# Patient Record
Sex: MALE | Race: WHITE | Employment: FULL TIME | URBAN - NONMETROPOLITAN AREA
[De-identification: names, ages, dates, MRNs, and addresses within clinical notes are randomized per-mention and may not be internally consistent; named-entity substitution may affect disease eponyms.]

---

## 2017-05-24 ENCOUNTER — HOSPITAL ENCOUNTER (EMERGENCY)
Facility: HOSPITAL | Age: 47
Discharge: HOME | End: 2017-05-24
Attending: EMERGENCY MEDICINE | Admitting: EMERGENCY MEDICINE
Payer: COMMERCIAL

## 2017-05-24 VITALS
DIASTOLIC BLOOD PRESSURE: 86 MMHG | TEMPERATURE: 98.6 F | WEIGHT: 300 LBS | OXYGEN SATURATION: 97 % | SYSTOLIC BLOOD PRESSURE: 159 MMHG | HEART RATE: 71 BPM | RESPIRATION RATE: 22 BRPM

## 2017-05-24 DIAGNOSIS — I10 ESSENTIAL HYPERTENSION: ICD-10-CM

## 2017-05-24 DIAGNOSIS — R07.89 ATYPICAL CHEST PAIN: ICD-10-CM

## 2017-05-24 DIAGNOSIS — E78.00 HYPERCHOLESTEREMIA: ICD-10-CM

## 2017-05-24 LAB
ALBUMIN SERPL-MCNC: 3.8 G/DL (ref 3.4–5)
ALBUMIN UR-MCNC: 30 MG/DL
ALP SERPL-CCNC: 65 U/L (ref 40–150)
ALT SERPL W P-5'-P-CCNC: 56 U/L (ref 0–70)
AMPHETAMINES UR QL SCN: NORMAL
ANION GAP SERPL CALCULATED.3IONS-SCNC: 9 MMOL/L (ref 3–14)
APPEARANCE UR: CLEAR
AST SERPL W P-5'-P-CCNC: 43 U/L (ref 0–45)
BACTERIA #/AREA URNS HPF: ABNORMAL /HPF
BARBITURATES UR QL: NORMAL
BASOPHILS # BLD AUTO: 0 10E9/L (ref 0–0.2)
BASOPHILS NFR BLD AUTO: 0.7 %
BENZODIAZ UR QL: NORMAL
BILIRUB SERPL-MCNC: 0.6 MG/DL (ref 0.2–1.3)
BILIRUB UR QL STRIP: NEGATIVE
BUN SERPL-MCNC: 11 MG/DL (ref 7–30)
CALCIUM SERPL-MCNC: 8.1 MG/DL (ref 8.5–10.1)
CANNABINOIDS UR QL SCN: NORMAL
CHLORIDE SERPL-SCNC: 101 MMOL/L (ref 94–109)
CHOLEST SERPL-MCNC: 259 MG/DL
CK SERPL-CCNC: 171 U/L (ref 30–300)
CO2 SERPL-SCNC: 28 MMOL/L (ref 20–32)
COCAINE UR QL: NORMAL
COLOR UR AUTO: YELLOW
CREAT SERPL-MCNC: 0.81 MG/DL (ref 0.66–1.25)
CRP SERPL-MCNC: <2.9 MG/L (ref 0–8)
D DIMER PPP DDU-MCNC: <200 NG/ML D-DU (ref 0–300)
DIFFERENTIAL METHOD BLD: NORMAL
EOSINOPHIL # BLD AUTO: 0.1 10E9/L (ref 0–0.7)
EOSINOPHIL NFR BLD AUTO: 2.6 %
ERYTHROCYTE [DISTWIDTH] IN BLOOD BY AUTOMATED COUNT: 12.8 % (ref 10–15)
ERYTHROCYTE [SEDIMENTATION RATE] IN BLOOD BY WESTERGREN METHOD: 6 MM/H (ref 0–15)
GFR SERPL CREATININE-BSD FRML MDRD: ABNORMAL ML/MIN/1.7M2
GLUCOSE SERPL-MCNC: 96 MG/DL (ref 70–99)
GLUCOSE UR STRIP-MCNC: NEGATIVE MG/DL
HCT VFR BLD AUTO: 44.1 % (ref 40–53)
HDLC SERPL-MCNC: 70 MG/DL
HGB BLD-MCNC: 15.3 G/DL (ref 13.3–17.7)
HGB UR QL STRIP: NEGATIVE
IMM GRANULOCYTES # BLD: 0 10E9/L (ref 0–0.4)
IMM GRANULOCYTES NFR BLD: 0.4 %
KETONES UR STRIP-MCNC: NEGATIVE MG/DL
LDLC SERPL CALC-MCNC: 174 MG/DL
LEUKOCYTE ESTERASE UR QL STRIP: NEGATIVE
LIPASE SERPL-CCNC: 98 U/L (ref 73–393)
LYMPHOCYTES # BLD AUTO: 1.2 10E9/L (ref 0.8–5.3)
LYMPHOCYTES NFR BLD AUTO: 25.8 %
MAGNESIUM SERPL-MCNC: 1.8 MG/DL (ref 1.6–2.3)
MCH RBC QN AUTO: 32.4 PG (ref 26.5–33)
MCHC RBC AUTO-ENTMCNC: 34.7 G/DL (ref 31.5–36.5)
MCV RBC AUTO: 93 FL (ref 78–100)
METHADONE UR QL SCN: NORMAL
MONOCYTES # BLD AUTO: 0.6 10E9/L (ref 0–1.3)
MONOCYTES NFR BLD AUTO: 13.8 %
MUCOUS THREADS #/AREA URNS LPF: PRESENT /LPF
NEUTROPHILS # BLD AUTO: 2.6 10E9/L (ref 1.6–8.3)
NEUTROPHILS NFR BLD AUTO: 56.7 %
NITRATE UR QL: NEGATIVE
NONHDLC SERPL-MCNC: 189 MG/DL
NRBC # BLD AUTO: 0 10*3/UL
NRBC BLD AUTO-RTO: 0 /100
OPIATES UR QL SCN: NORMAL
PCP UR QL SCN: NORMAL
PH UR STRIP: 7 PH (ref 4.7–8)
PLATELET # BLD AUTO: 159 10E9/L (ref 150–450)
POTASSIUM SERPL-SCNC: 3.7 MMOL/L (ref 3.4–5.3)
PROT SERPL-MCNC: 7.3 G/DL (ref 6.8–8.8)
RBC # BLD AUTO: 4.72 10E12/L (ref 4.4–5.9)
RBC #/AREA URNS AUTO: <1 /HPF (ref 0–2)
SODIUM SERPL-SCNC: 138 MMOL/L (ref 133–144)
SP GR UR STRIP: 1.02 (ref 1–1.03)
TRIGL SERPL-MCNC: 76 MG/DL
TROPONIN I SERPL-MCNC: NORMAL UG/L (ref 0–0.04)
TROPONIN I SERPL-MCNC: NORMAL UG/L (ref 0–0.04)
URN SPEC COLLECT METH UR: ABNORMAL
UROBILINOGEN UR STRIP-MCNC: NORMAL MG/DL (ref 0–2)
WBC # BLD AUTO: 4.6 10E9/L (ref 4–11)
WBC #/AREA URNS AUTO: <1 /HPF (ref 0–2)

## 2017-05-24 PROCEDURE — 93010 ELECTROCARDIOGRAM REPORT: CPT | Performed by: INTERNAL MEDICINE

## 2017-05-24 PROCEDURE — 86140 C-REACTIVE PROTEIN: CPT | Performed by: EMERGENCY MEDICINE

## 2017-05-24 PROCEDURE — 85379 FIBRIN DEGRADATION QUANT: CPT | Performed by: EMERGENCY MEDICINE

## 2017-05-24 PROCEDURE — 82550 ASSAY OF CK (CPK): CPT | Performed by: EMERGENCY MEDICINE

## 2017-05-24 PROCEDURE — 83690 ASSAY OF LIPASE: CPT | Performed by: EMERGENCY MEDICINE

## 2017-05-24 PROCEDURE — 96374 THER/PROPH/DIAG INJ IV PUSH: CPT

## 2017-05-24 PROCEDURE — 85652 RBC SED RATE AUTOMATED: CPT | Performed by: EMERGENCY MEDICINE

## 2017-05-24 PROCEDURE — 80053 COMPREHEN METABOLIC PANEL: CPT | Performed by: EMERGENCY MEDICINE

## 2017-05-24 PROCEDURE — 96361 HYDRATE IV INFUSION ADD-ON: CPT

## 2017-05-24 PROCEDURE — 99285 EMERGENCY DEPT VISIT HI MDM: CPT | Performed by: EMERGENCY MEDICINE

## 2017-05-24 PROCEDURE — 36415 COLL VENOUS BLD VENIPUNCTURE: CPT | Performed by: EMERGENCY MEDICINE

## 2017-05-24 PROCEDURE — 71020 ZZHC CHEST TWO VIEWS, FRONT/LAT: CPT | Mod: TC

## 2017-05-24 PROCEDURE — 80307 DRUG TEST PRSMV CHEM ANLYZR: CPT | Performed by: EMERGENCY MEDICINE

## 2017-05-24 PROCEDURE — 81001 URINALYSIS AUTO W/SCOPE: CPT | Performed by: EMERGENCY MEDICINE

## 2017-05-24 PROCEDURE — 84484 ASSAY OF TROPONIN QUANT: CPT | Mod: 91 | Performed by: EMERGENCY MEDICINE

## 2017-05-24 PROCEDURE — 83735 ASSAY OF MAGNESIUM: CPT | Performed by: EMERGENCY MEDICINE

## 2017-05-24 PROCEDURE — 25000128 H RX IP 250 OP 636: Performed by: EMERGENCY MEDICINE

## 2017-05-24 PROCEDURE — 80061 LIPID PANEL: CPT | Performed by: EMERGENCY MEDICINE

## 2017-05-24 PROCEDURE — 25000132 ZZH RX MED GY IP 250 OP 250 PS 637: Performed by: EMERGENCY MEDICINE

## 2017-05-24 PROCEDURE — 85025 COMPLETE CBC W/AUTO DIFF WBC: CPT | Performed by: EMERGENCY MEDICINE

## 2017-05-24 PROCEDURE — 99285 EMERGENCY DEPT VISIT HI MDM: CPT | Mod: 25

## 2017-05-24 PROCEDURE — 93005 ELECTROCARDIOGRAM TRACING: CPT

## 2017-05-24 RX ORDER — ATORVASTATIN CALCIUM 20 MG/1
20 TABLET, FILM COATED ORAL DAILY
Qty: 30 TABLET | Refills: 1 | Status: SHIPPED | OUTPATIENT
Start: 2017-05-24 | End: 2020-01-01

## 2017-05-24 RX ORDER — ASPIRIN 81 MG/1
324 TABLET, CHEWABLE ORAL ONCE
Status: DISCONTINUED | OUTPATIENT
Start: 2017-05-24 | End: 2017-05-24 | Stop reason: HOSPADM

## 2017-05-24 RX ORDER — LIDOCAINE 40 MG/G
CREAM TOPICAL
Status: DISCONTINUED | OUTPATIENT
Start: 2017-05-24 | End: 2017-05-24 | Stop reason: HOSPADM

## 2017-05-24 RX ORDER — LISINOPRIL 5 MG/1
10 TABLET ORAL ONCE
Status: COMPLETED | OUTPATIENT
Start: 2017-05-24 | End: 2017-05-24

## 2017-05-24 RX ORDER — ATORVASTATIN CALCIUM 10 MG/1
20 TABLET, FILM COATED ORAL DAILY
Status: DISCONTINUED | OUTPATIENT
Start: 2017-05-24 | End: 2017-05-24 | Stop reason: HOSPADM

## 2017-05-24 RX ORDER — SODIUM CHLORIDE 9 MG/ML
INJECTION, SOLUTION INTRAVENOUS CONTINUOUS
Status: DISCONTINUED | OUTPATIENT
Start: 2017-05-24 | End: 2017-05-24 | Stop reason: HOSPADM

## 2017-05-24 RX ORDER — LISINOPRIL 5 MG/1
10 TABLET ORAL DAILY
Qty: 60 TABLET | Refills: 1 | Status: SHIPPED | OUTPATIENT
Start: 2017-05-24 | End: 2020-01-01

## 2017-05-24 RX ORDER — LORAZEPAM 2 MG/ML
1 INJECTION INTRAMUSCULAR ONCE
Status: COMPLETED | OUTPATIENT
Start: 2017-05-24 | End: 2017-05-24

## 2017-05-24 RX ORDER — NITROGLYCERIN 0.4 MG/1
0.4 TABLET SUBLINGUAL EVERY 5 MIN PRN
Status: DISCONTINUED | OUTPATIENT
Start: 2017-05-24 | End: 2017-05-24 | Stop reason: HOSPADM

## 2017-05-24 RX ADMIN — LORAZEPAM 1 MG: 2 INJECTION, SOLUTION INTRAMUSCULAR; INTRAVENOUS at 09:26

## 2017-05-24 RX ADMIN — ATORVASTATIN CALCIUM 20 MG: 10 TABLET, FILM COATED ORAL at 11:15

## 2017-05-24 RX ADMIN — SODIUM CHLORIDE: 9 INJECTION, SOLUTION INTRAVENOUS at 09:25

## 2017-05-24 RX ADMIN — LISINOPRIL 10 MG: 5 TABLET ORAL at 11:15

## 2017-05-24 ASSESSMENT — ENCOUNTER SYMPTOMS
FATIGUE: 1
LIGHT-HEADEDNESS: 1
BACK PAIN: 1
SHORTNESS OF BREATH: 1
DYSPHORIC MOOD: 1
CHEST TIGHTNESS: 1
EYE REDNESS: 1
NUMBNESS: 1
NERVOUS/ANXIOUS: 1
NAUSEA: 1
DIZZINESS: 1
MYALGIAS: 1
PALPITATIONS: 1
WEAKNESS: 1

## 2017-05-24 NOTE — ED AVS SNAPSHOT
[facility]    [address] [address]    Phone:  [phone]                                       Shahrzad Walters   MRN: [id number]    Department:  [facility]   Date of Visit:  5/24/2017           Patient Information     Date Of Birth          1970        Your diagnoses for this visit were:     Atypical chest pain     Essential hypertension     Hypercholesteremia     Alcoholism /alcohol abuse (H)        You were seen by Chai Vaz MD.      Follow-up Information     Follow up with None.        Follow up with None.    Why:  As needed        Follow up In 2 weeks.        Discharge Instructions       Shahrzad,  Your chest pain today was assumed to be your heart but your ECG and Labs did not point to there being any cardiac injury.  Your risk factors including hypertension and elevated LDL cholesterol were noted and will hopefully respond to the meds we have started.  The nuclear imaging stress test has been ordered.  The Imaging dep't should call you with scheduling details.  Then followup with your provider people at [facility].  If that is a problem, let us know and the ER  can perhaps make some other appointment for you.  Hopefully, you will see the lite on your alcohol intake.  Stopping would solve several risk issues for you especially the depression and hypertension.  Nice meeting you.  Good luck with your work at [employer]--have a good summer.            Unfortunately, Dr. Cobb does not have any openings until August. A follow up appointment is scheduled with Demi Castellon NP at the [facility] in [place] on Wednesday 5/31 at 11:30. If you will need to reschedule this appointment, the clinic contact number is [phone].     ED Discharge Orders     NM Exercise stress test       The type of stress to be performed (pharmacologic or physical) will be at the discretion of the supervising physician as per department protocol.                     Review  of your medicines      START taking        Dose / Directions Last dose taken    atorvastatin 20 MG tablet   Commonly known as:  LIPITOR   Dose:  20 mg   Quantity:  30 tablet        Take 1 tablet (20 mg) by mouth daily   Refills:  1        lisinopril 5 MG tablet   Commonly known as:  PRINIVIL/ZESTRIL   Dose:  10 mg   Quantity:  60 tablet        Take 2 tablets (10 mg) by mouth daily   Refills:  1          Our records show that you are taking the medicines listed below. If these are incorrect, please call your family doctor or clinic.        Dose / Directions Last dose taken    CYMBALTA PO        Take by mouth daily   Refills:  0        EPINEPHrine 0.3 MG/0.3ML injection   Dose:  0.3 mg   Quantity:  0.6 mL        Inject 0.3 mLs (0.3 mg) into the muscle once as needed for anaphylaxis   Refills:  1                Prescriptions were sent or printed at these locations (2 Prescriptions)                   Other Prescriptions                Printed at Department/Unit printer (2 of 2)         lisinopril (PRINIVIL/ZESTRIL) 5 MG tablet               atorvastatin (LIPITOR) 20 MG tablet                Procedures and tests performed during your visit     Procedure/Test Number of Times Performed    CBC with platelets differential 1    CK total 1    CRP inflammation 1    Comprehensive metabolic panel 1    D-Dimer (FV Range) 1    Drug Screen Urine (Range) 1    EKG 12-lead, tracing only 1    Erythrocyte sedimentation rate auto 1    Lipase 1    Lipid Profile 1    Magnesium 1    Peripheral IV catheter 1    Troponin I 2    UA with Microscopic 1    XR Chest 2 Views 1      Orders Needing Specimen Collection     None      Pending Results     Date and Time Order Name Status Description    5/24/2017 0914 XR Chest 2 Views Preliminary             Pending Culture Results     No orders found from 5/22/2017 to 5/25/2017.            Thank you for choosing [facility]       Thank you for choosing [facility] for your care. Our goal is always to provide you  "with excellent care. Hearing back from our patients is one way we can continue to improve our services. Please take a few minutes to complete the written survey that you may receive in the mail after you visit with us. Thank you!        [facility] Information     [facility] lets you send messages to your doctor, view your test results, renew your prescriptions, schedule appointments and more. To sign up, go to [email or web address] . Click on \"Log in\" on the left side of the screen, which will take you to the Welcome page. Then click on \"Sign up Now\" on the right side of the page.     You will be asked to enter the access code listed below, as well as some personal information. Please follow the directions to create your username and password.     Your access code is: [email or web address]  Expires: 2017  1:31 PM     Your access code will  in 90 days. If you need help or a new code, please call your [facility] clinic or [phone].        Care EveryWhere ID     This is your Care EveryWhere ID. This could be used by other organizations to access your [facility] medical records  [id number]        After Visit Summary       This is your record. Keep this with you and show to your community pharmacist(s) and doctor(s) at your next visit.                  "

## 2017-05-24 NOTE — LETTER
[facility]  [address] [address] [phone]    May 24, 2017    Shahrzad Walters  [address] [facility] [address] [phone] (home)     : 1970      To Whom it may concern:    Shahrzad Walters was seen in our Emergency Department today, May 24, 2017.  I expect his condition to improve over the next 2 days.  He may return to work/school when improved.    Sincerely,        Chai Vaz Jr. MD

## 2017-05-24 NOTE — ED NOTES
Pt brought in via EMS for complaints of chest pain, dizziness and weakness onset at 0730 this am while at work. Pt reports he was sitting in on morning meeting when sx presented. Was brought in by [facility]. Pt was hypertensive on their arrival and was given 5mg IV metoprolol and 162 of Aspirin as pt took 2 this am.

## 2017-05-24 NOTE — PROGRESS NOTES
Into see Shahrzad to assist with PCP establishment. Pt states his past medical care was at [facility] and would like to be set up with Dr. Rolando Cobb.   Will facilitate follow/est care appointment.

## 2017-05-24 NOTE — ED NOTES
"Pt also notes that he is a \"heavy drinker\". Pt states that he drinks a pint of vodka daily and 1/2 pint of schnapps daily.   "

## 2017-05-24 NOTE — ED AVS SNAPSHOT
[facility]    [address] [place] [address]    Phone:  [phone]                                       Shahrzad Walters   MRN: [id number]    Department:  [facility]   Date of Visit:  5/24/2017           After Visit Summary Signature Page     I have received my discharge instructions, and my questions have been answered. I have discussed any challenges I see with this plan with the nurse or doctor.    ..........................................................................................................................................  Patient/Patient Representative Signature      ..........................................................................................................................................  Patient Representative Print Name and Relationship to Patient    ..................................................               ................................................  Date                                            Time    ..........................................................................................................................................  Reviewed by Signature/Title    ...................................................              ..............................................  Date                                                            Time

## 2017-05-24 NOTE — ED NOTES
Pt given verbal and written d/c instructions and verbalizes understanding. Pt also given note for work and written prescriptions for lisinopril and lipitor. Pt left ambulatory with co-worker.

## 2017-05-24 NOTE — ED NOTES
"Pt arrives via [facility]. Pt was at work at [employer] and was in the morning meeting when he noted left sided chest discomfort and did \"not feel right\". Pt was hypertensive upon EMS arrival and was given 5mg lopressor IVP. Pt states that pain is now 1/10. Pt doesn't have a PCP. Assessment as charted and call light in reach.  "

## 2017-05-24 NOTE — ED PROVIDER NOTES
History     Chief Complaint   Patient presents with     Chest Pain     HPI  Shahrzad Walters is a 46 year old male who was brought over from [facility] in [place] by [facility].  He had experienced precordial pain with associated shortness of breath, tingling and heavy feeling in both arms, lightheaded whoozy feeling, and anxious feeling of losing control.  This lasted for ~30 minutes and he was transported to [place] rather than [place] per his request stable enroute.  Shahrzad admits to ongoing heavy 1/2 Liter alcohol consumption per day and ongoing smoking.  He has unknown risk factors but admits to HTN untreated. He has been seen for depression and alcohol counselling at [facility].  Currently does not feel motivated to stop drinking and smoking. Evaluated 10 years ago with stress test and monitors because of similar such pains.      I have reviewed the Medications, Allergies, Past Medical and Surgical History, and Social History in the Epic system.    Review of Systems   Constitutional: Positive for fatigue.   Eyes: Positive for redness.   Respiratory: Positive for chest tightness and shortness of breath.    Cardiovascular: Positive for chest pain and palpitations.   Gastrointestinal: Positive for nausea.   Musculoskeletal: Positive for back pain and myalgias.   Neurological: Positive for dizziness, weakness, light-headedness and numbness.   Psychiatric/Behavioral: Positive for dysphoric mood. The patient is nervous/anxious.    All other systems reviewed and are negative.    Physical Exam   BP: (!) 187/108  Pulse: 96  Heart Rate: 59  Temp: 98.9  F (37.2  C)  Resp: 20  Weight: 136.1 kg (300 lb)  SpO2: 97 %  Physical Exam   Constitutional: He is oriented to person, place, and time. He appears well-developed and well-nourished. No distress.   Increased BMI fellow with truncal obesity, flushed features appearing somewhat anxious and deflecting.    HENT:   Head: Normocephalic and atraumatic.   Right Ear:  External ear normal.   Left Ear: External ear normal.   Eyes: Conjunctivae and EOM are normal. Pupils are equal, round, and reactive to light.   OU conjunctival injection   Neck: Normal range of motion. No JVD present. No tracheal deviation present. No thyromegaly present.   Cardiovascular: Normal rate, regular rhythm and intact distal pulses.    No murmur heard.  Pulmonary/Chest: Effort normal and breath sounds normal. No respiratory distress. He has no wheezes. He has no rales. He exhibits no tenderness.   Abdominal: Soft. Bowel sounds are normal. He exhibits no distension. There is no tenderness. There is no rebound and no guarding.   Musculoskeletal: Normal range of motion. He exhibits no edema.   Neurological: He is alert and oriented to person, place, and time.   Skin: Skin is warm. He is not diaphoretic.     ED Course     ED Course     Procedures  ECG  Sinus bradycardia, moderate voltage criteria for LVH, normal variant, QTc 429ms   Critical Care time:  none    Labs Ordered and Resulted from Time of ED Arrival Up to the Time of Departure from the ED   ROUTINE UA WITH MICROSCOPIC - Abnormal; Notable for the following:        Result Value    Protein Albumin Urine 30 (*)     Bacteria Urine None (*)     Mucous Urine Present (*)     All other components within normal limits   COMPREHENSIVE METABOLIC PANEL - Abnormal; Notable for the following:     Calcium 8.1 (*)     All other components within normal limits   LIPID PROFILE - Abnormal; Notable for the following:     Cholesterol 259 (*)     LDL Cholesterol Calculated 174 (*)     Non HDL Cholesterol 189 (*)     All other components within normal limits   DRUG SCREEN URINE (RANGE)   CBC WITH PLATELETS DIFFERENTIAL   CK TOTAL   CRP INFLAMMATION   D-DIMER (FV RANGE)   ERYTHROCYTE SEDIMENTATION RATE AUTO   LIPASE   MAGNESIUM   TROPONIN I   TROPONIN I   PERIPHERAL IV CATHETER     Assessments & Plan (with Medical Decision Making)   Shahrzad had an episode of chest pain that  was assumed to be cardiac.  This seems to have been followed with a hyperventilation panick attack probably mediated by his brittle anxious nervous system as he detoxes from the previous nites alcohol consumption.  IV was placed and labs obtained. He had minimal further chest pain so other than ASA 325mg nursing elected to not give nitro.  His BP was up so ativan 1mg IV was given.  Labs were all reassuring other than elevated total chol of 259 and LDL chol of 174.  Lipitor 20mg po and lisinopril 10mg tabs given for these problems and continued with the discharge meds noted below.  Also scheduled for Cardiolite and advised f/u at the [facility].   I have reviewed the nursing notes.    I have reviewed the findings, diagnosis, plan and need for follow up with the patient.    New Prescriptions    ATORVASTATIN (LIPITOR) 20 MG TABLET    Take 1 tablet (20 mg) by mouth daily    LISINOPRIL (PRINIVIL/ZESTRIL) 5 MG TABLET    Take 2 tablets (10 mg) by mouth daily       Final diagnoses:   Atypical chest pain   Essential hypertension   Hypercholesteremia   Alcoholism /alcohol abuse (H)       5/24/2017   [facility]     Chai Vaz MD  05/24/17 3237

## 2017-05-24 NOTE — DISCHARGE INSTRUCTIONS
Shahrzad,  Your chest pain today was assumed to be your heart but your ECG and Labs did not point to there being any cardiac injury.  Your risk factors including hypertension and elevated LDL cholesterol were noted and will hopefully respond to the meds we have started.  The nuclear imaging stress test has been ordered.  The Imaging dep't should call you with scheduling details.  Then followup with your provider people at [facility].  If that is a problem, let us know and the ER  can perhaps make some other appointment for you.  Hopefully, you will see the lite on your alcohol intake.  Stopping would solve several risk issues for you especially the depression and hypertension.  Nice meeting you.  Good luck with your work at [employer]--have a good summer.            Unfortunately, Dr. Cobb does not have any openings until August. A follow up appointment is scheduled with Demi Castellon NP at the [facility] in [place] on Wednesday 5/31 at 11:30. If you will need to reschedule this appointment, the clinic contact number is [phone].

## 2017-05-24 NOTE — PROGRESS NOTES
Called [facility] for follow up. Dr. Cobb is booked until August. ED follow up is scheduled for May 25th at 1130 with Demi Castellon.

## 2017-05-25 DIAGNOSIS — I10 PRIMARY HYPERTENSION: Primary | ICD-10-CM

## 2017-05-25 DIAGNOSIS — R07.89 OTHER CHEST PAIN: ICD-10-CM

## 2017-05-26 ENCOUNTER — TELEPHONE (OUTPATIENT)
Dept: NUCLEAR MEDICINE | Facility: HOSPITAL | Age: 47
End: 2017-05-26

## 2017-05-26 NOTE — TELEPHONE ENCOUNTER
Patient was scheduled for Nuclear Medicine Stress Test 5-26-17 at 0730.  Patient called admitting this morning at 0605 to cancel this test.  Two doses have been wasted.  A reminder call was performed on 5-25-17 and a voice message was left.

## 2017-06-08 ENCOUNTER — HOSPITAL ENCOUNTER (OUTPATIENT)
Dept: NUCLEAR MEDICINE | Facility: HOSPITAL | Age: 47
Discharge: HOME | End: 2017-06-08
Attending: EMERGENCY MEDICINE | Admitting: EMERGENCY MEDICINE
Payer: COMMERCIAL

## 2017-06-08 PROCEDURE — 93018 CV STRESS TEST I&R ONLY: CPT | Performed by: INTERNAL MEDICINE

## 2017-06-08 PROCEDURE — 93016 CV STRESS TEST SUPVJ ONLY: CPT | Performed by: INTERNAL MEDICINE

## 2017-06-08 PROCEDURE — A9500 TC99M SESTAMIBI: HCPCS | Mod: TC

## 2017-06-08 PROCEDURE — 78452 HT MUSCLE IMAGE SPECT MULT: CPT | Mod: TC

## 2017-06-08 PROCEDURE — 93017 CV STRESS TEST TRACING ONLY: CPT | Mod: TC

## 2018-06-13 ENCOUNTER — HOSPITAL ENCOUNTER (EMERGENCY)
Facility: HOSPITAL | Age: 48
Discharge: HOME | End: 2018-06-13
Attending: NURSE PRACTITIONER | Admitting: NURSE PRACTITIONER
Payer: COMMERCIAL

## 2018-06-13 VITALS
RESPIRATION RATE: 16 BRPM | SYSTOLIC BLOOD PRESSURE: 106 MMHG | TEMPERATURE: 96 F | DIASTOLIC BLOOD PRESSURE: 59 MMHG | OXYGEN SATURATION: 95 %

## 2018-06-13 DIAGNOSIS — S09.93XA INJURY OF TONGUE, INITIAL ENCOUNTER: ICD-10-CM

## 2018-06-13 PROBLEM — F41.9 ANXIETY: Status: ACTIVE | Noted: 2018-02-12

## 2018-06-13 PROBLEM — I10 ESSENTIAL HYPERTENSION: Status: ACTIVE | Noted: 2017-06-14

## 2018-06-13 PROBLEM — F33.42 RECURRENT MAJOR DEPRESSIVE DISORDER, IN FULL REMISSION (H): Status: ACTIVE | Noted: 2017-08-02

## 2018-06-13 PROBLEM — E66.9 OBESITY (BMI 35.0-39.9 WITHOUT COMORBIDITY): Status: ACTIVE | Noted: 2017-08-02

## 2018-06-13 PROBLEM — E78.00 PURE HYPERCHOLESTEROLEMIA: Status: ACTIVE | Noted: 2017-08-02

## 2018-06-13 PROBLEM — Z72.0 TOBACCO ABUSE: Status: ACTIVE | Noted: 2017-08-02

## 2018-06-13 PROCEDURE — 99202 OFFICE O/P NEW SF 15 MIN: CPT | Performed by: NURSE PRACTITIONER

## 2018-06-13 PROCEDURE — G0463 HOSPITAL OUTPT CLINIC VISIT: HCPCS

## 2018-06-13 RX ORDER — CITALOPRAM HYDROBROMIDE 20 MG/1
20 TABLET ORAL
COMMUNITY
Start: 2018-05-22 | End: 2020-01-01

## 2018-06-13 RX ORDER — HYDROCHLOROTHIAZIDE 12.5 MG/1
CAPSULE ORAL
COMMUNITY
Start: 2018-06-03 | End: 2020-01-01

## 2018-06-13 ASSESSMENT — ENCOUNTER SYMPTOMS
CONSTITUTIONAL NEGATIVE: 1
SHORTNESS OF BREATH: 0

## 2018-06-13 NOTE — ED PROVIDER NOTES
History     Chief Complaint   Patient presents with     tongue pain     c/o bit his tongue, notes did it in his sleep     The history is provided by the patient. No  was used.     Shahrzad Walters is a 47 year old male who presents with concerns for suturing an area of his tongue that he bit last night. Reports doing it while he was sleeping.   Bleeding controlled at this time. He is able to eat and drink without difficulty.      Problem List:    Patient Active Problem List    Diagnosis Date Noted     Anxiety 02/12/2018     Priority: Medium     Tobacco abuse 08/02/2017     Priority: Medium     Recurrent major depressive disorder, in full remission (H) 08/02/2017     Priority: Medium     Pure hypercholesterolemia 08/02/2017     Priority: Medium     Obesity (BMI 35.0-39.9 without comorbidity) 08/02/2017     Priority: Medium     Essential hypertension 06/14/2017     Priority: Medium     Alcohol abuse 12/30/2015     Priority: Medium        Past Medical History:    Past Medical History:   Diagnosis Date     Anxiety      Depressive disorder        Past Surgical History:    No past surgical history on file.    Family History:    No family history on file.    Social History:  Marital Status:   [4]  Social History   Substance Use Topics     Smoking status: Current Every Day Smoker     Packs/day: 1.00     Smokeless tobacco: Not on file     Alcohol use Yes      Comment: 1pt of vodka and 1/2 pint of schnaps daily. worse on weekends        Medications:      citalopram (CELEXA) 20 MG tablet   hydrochlorothiazide (MICROZIDE) 12.5 MG capsule   atorvastatin (LIPITOR) 20 MG tablet   DULoxetine HCl (CYMBALTA PO)   EPINEPHrine (EPIPEN) 0.3 MG/0.3ML injection   lisinopril (PRINIVIL/ZESTRIL) 5 MG tablet         Review of Systems   Constitutional: Negative.    HENT: Positive for mouth sores.    Respiratory: Negative for shortness of breath.        Physical Exam   BP: 106/59  Heart Rate: 99  Temp: 96  F (35.6   C)  Resp: 16  SpO2: 95 %      Physical Exam   Constitutional: He appears well-developed and well-nourished. No distress.   HENT:   Head: Normocephalic and atraumatic.   Right Ear: External ear normal.   Left Ear: External ear normal.   Nose: Nose normal.   Mouth/Throat: Uvula is midline and mucous membranes are normal. Oral lesions present. No trismus in the jaw.       Eyes: Conjunctivae are normal. No scleral icterus.   Neck: Normal range of motion.   Pulmonary/Chest: Effort normal.   Skin: He is not diaphoretic.   Nursing note and vitals reviewed.      ED Course     ED Course     Procedures     No results found for this or any previous visit (from the past 24 hour(s)).    Medications   lidocaine (viscous) (XYLOCAINE) 2 % solution 5 mL (not administered)       Assessments & Plan (with Medical Decision Making)     I have reviewed the nursing notes.  I have reviewed the findings, diagnosis, plan and need for follow up with the patient.  Superficial mouth wound, no sutures required.   Given wound care instructions.   F/u with PCP or dentist if not improving.  Return here if sx worsen.     Discharge Medication List as of 6/13/2018  1:55 PM          Final diagnoses:   Injury of tongue, initial encounter       6/13/2018   [facility]     Evelyn Arriaga NP  06/13/18 [id number]

## 2018-06-13 NOTE — ED AVS SNAPSHOT
[facility]    [address] [place] [address]    Phone:  [phone]                                       Shahrzad Walters   MRN: [id number]    Department:  [facility]   Date of Visit:  6/13/2018           After Visit Summary Signature Page     I have received my discharge instructions, and my questions have been answered. I have discussed any challenges I see with this plan with the nurse or doctor.    ..........................................................................................................................................  Patient/Patient Representative Signature      ..........................................................................................................................................  Patient Representative Print Name and Relationship to Patient    ..................................................               ................................................  Date                                            Time    ..........................................................................................................................................  Reviewed by Signature/Title    ...................................................              ..............................................  Date                                                            Time

## 2018-06-13 NOTE — DISCHARGE INSTRUCTIONS
Rinse with salt water (1/4 teaspoon to 1/2 cup warm water) after each meal.  Brush your teeth as you normally would 2-3 times a day.   Eat soft foods for 4-5 days, avoid chewing anything hard.     Follow up with Dentist or PCP if not improving over the next week.

## 2018-06-13 NOTE — ED AVS SNAPSHOT
[facility]    [address] [address]    Phone:  [phone]                                       Shahrzad Walters   MRN: [id number]    Department:  [facility]   Date of Visit:  6/13/2018           Patient Information     Date Of Birth          1970        Your diagnoses for this visit were:     Injury of tongue, initial encounter        You were seen by Evelyn Arriaga NP.      Follow-up Information     Follow up with Levar Cobb MD.    Specialty:  Family Practice    Why:  As needed or see the dentist    Contact information:    [facility]  [address] [facility] [address] [phone]          Follow up with [facility].    Specialty:  EMERGENCY MEDICINE    Why:  If symptoms worsen     Contact information:    [address] [address] [phone]    Additional information:    From [facility]: Take [address]. Turn left at [address]. Turn left at the first stoplight on [address]. At the first stop sign, take a right onto [address]. Take a left into the parking lot and continue through until you reach the North enterance of the building.       From [place]: Take [address]. Take the [address] ramp towards [place]. Turn left onto [address]. Take a slight right onto [address] [facility]. Turn left at the first stoplight on [address]. At the first stop sign, take a right onto [address]. Take a left into the parking lot and continue through until you reach the North enterance of the building.       From [place]: Take [address]. Take a right at [address]. At the first stop sign, take a right onto [address]. Take a left into the parking lot and continue through until you reach the North enterance of the building.         Discharge Instructions       Rinse with salt water (1/4 teaspoon to 1/2 cup warm water) after each  meal.  Brush your teeth as you normally would 2-3 times a day.   Eat soft foods for 4-5 days, avoid chewing anything hard.     Follow up with Dentist or PCP if not improving over the next week.        Review of your medicines      Our records show that you are taking the medicines listed below. If these are incorrect, please call your family doctor or clinic.        Dose / Directions Last dose taken    atorvastatin 20 MG tablet   Commonly known as:  LIPITOR   Dose:  20 mg   Quantity:  30 tablet        Take 1 tablet (20 mg) by mouth daily   Refills:  1        citalopram 20 MG tablet   Commonly known as:  celeXA   Dose:  20 mg        Take 20 mg by mouth   Refills:  0        CYMBALTA PO        Take by mouth daily   Refills:  0        EPINEPHrine 0.3 MG/0.3ML injection 2-pack   Commonly known as:  EPIPEN/ADRENACLICK/or ANY BX GENERIC EQUIV   Dose:  0.3 mg   Quantity:  0.6 mL        Inject 0.3 mLs (0.3 mg) into the muscle once as needed for anaphylaxis   Refills:  1        hydrochlorothiazide 12.5 MG capsule   Commonly known as:  MICROZIDE        Take one capsule daily   Refills:  0        lisinopril 5 MG tablet   Commonly known as:  PRINIVIL/ZESTRIL   Dose:  10 mg   Quantity:  60 tablet        Take 2 tablets (10 mg) by mouth daily   Refills:  1                Orders Needing Specimen Collection     None      Pending Results     No orders found from 6/11/2018 to 6/14/2018.            Pending Culture Results     No orders found from 6/11/2018 to 6/14/2018.            Thank you for choosing [facility]       Thank you for choosing [facility] for your care. Our goal is always to provide you with excellent care. Hearing back from our patients is one way we can continue to improve our services. Please take a few minutes to complete the written survey that you may receive in the mail after you visit with us. Thank you!        [employer] Information     [employer] lets you send messages to your doctor, view your test results, renew your  "prescriptions, schedule appointments and more. To sign up, go to [email or web address] . Click on \"Log in\" on the left side of the screen, which will take you to the Welcome page. Then click on \"Sign up Now\" on the right side of the page.     You will be asked to enter the access code listed below, as well as some personal information. Please follow the directions to create your username and password.     Your access code is: [email or web address]  Expires: 2018  1:54 PM     Your access code will  in 90 days. If you need help or a new code, please call your [place] clinic or [phone].        Care EveryWhere ID     This is your Care EveryWhere ID. This could be used by other organizations to access your [facility] medical records  [id number]        Equal Access to Services     CRUZ LUCERO : Doreen Vallejo, nba coffman, vicente acevedo, deepak xie . So [facility] [phone].    ATENCIÓN: Si habla español, tiene a mckeon disposición servicios gratuitos de asistencia lingüística. Llame al [phone].    We comply with applicable federal civil rights laws and [place] laws. We do not discriminate on the basis of race, color, national origin, age, disability, sex, sexual orientation, or gender identity.            After Visit Summary       This is your record. Keep this with you and show to your community pharmacist(s) and doctor(s) at your next visit.                  "

## 2019-03-21 ENCOUNTER — APPOINTMENT (OUTPATIENT)
Dept: GENERAL RADIOLOGY | Facility: HOSPITAL | Age: 49
End: 2019-03-21
Attending: EMERGENCY MEDICINE
Payer: COMMERCIAL

## 2019-03-21 ENCOUNTER — HOSPITAL ENCOUNTER (EMERGENCY)
Facility: HOSPITAL | Age: 49
Discharge: HOME | End: 2019-03-21
Attending: EMERGENCY MEDICINE | Admitting: EMERGENCY MEDICINE
Payer: COMMERCIAL

## 2019-03-21 VITALS
OXYGEN SATURATION: 98 % | RESPIRATION RATE: 12 BRPM | DIASTOLIC BLOOD PRESSURE: 97 MMHG | TEMPERATURE: 98.1 F | HEART RATE: 83 BPM | WEIGHT: 300 LBS | SYSTOLIC BLOOD PRESSURE: 165 MMHG

## 2019-03-21 DIAGNOSIS — H66.92 ACUTE LEFT OTITIS MEDIA: ICD-10-CM

## 2019-03-21 DIAGNOSIS — H81.12 BENIGN PAROXYSMAL POSITIONAL VERTIGO OF LEFT EAR: Primary | ICD-10-CM

## 2019-03-21 LAB
ALBUMIN SERPL-MCNC: 4.2 G/DL (ref 3.4–5)
ALP SERPL-CCNC: 90 U/L (ref 40–150)
ALT SERPL W P-5'-P-CCNC: 83 U/L (ref 0–70)
ANION GAP SERPL CALCULATED.3IONS-SCNC: 13 MMOL/L (ref 3–14)
AST SERPL W P-5'-P-CCNC: 76 U/L (ref 0–45)
BASOPHILS # BLD AUTO: 0 10E9/L (ref 0–0.2)
BASOPHILS NFR BLD AUTO: 0.5 %
BILIRUB SERPL-MCNC: 0.6 MG/DL (ref 0.2–1.3)
BUN SERPL-MCNC: 8 MG/DL (ref 7–30)
CALCIUM SERPL-MCNC: 9.3 MG/DL (ref 8.5–10.1)
CHLORIDE SERPL-SCNC: 98 MMOL/L (ref 94–109)
CO2 SERPL-SCNC: 23 MMOL/L (ref 20–32)
CREAT SERPL-MCNC: 0.92 MG/DL (ref 0.66–1.25)
DIFFERENTIAL METHOD BLD: ABNORMAL
EOSINOPHIL # BLD AUTO: 0.1 10E9/L (ref 0–0.7)
EOSINOPHIL NFR BLD AUTO: 0.7 %
ERYTHROCYTE [DISTWIDTH] IN BLOOD BY AUTOMATED COUNT: 13.2 % (ref 10–15)
GFR SERPL CREATININE-BSD FRML MDRD: >90 ML/MIN/{1.73_M2}
GLUCOSE SERPL-MCNC: 114 MG/DL (ref 70–99)
HCT VFR BLD AUTO: 47.9 % (ref 40–53)
HGB BLD-MCNC: 17.2 G/DL (ref 13.3–17.7)
IMM GRANULOCYTES # BLD: 0 10E9/L (ref 0–0.4)
IMM GRANULOCYTES NFR BLD: 0.2 %
LACTATE BLD-SCNC: 1.3 MMOL/L (ref 0.7–2)
LACTATE BLD-SCNC: 4.3 MMOL/L (ref 0.7–2)
LIPASE SERPL-CCNC: 48 U/L (ref 73–393)
LYMPHOCYTES # BLD AUTO: 1.9 10E9/L (ref 0.8–5.3)
LYMPHOCYTES NFR BLD AUTO: 22.5 %
MCH RBC QN AUTO: 34.9 PG (ref 26.5–33)
MCHC RBC AUTO-ENTMCNC: 35.9 G/DL (ref 31.5–36.5)
MCV RBC AUTO: 97 FL (ref 78–100)
MONOCYTES # BLD AUTO: 0.8 10E9/L (ref 0–1.3)
MONOCYTES NFR BLD AUTO: 9.6 %
NEUTROPHILS # BLD AUTO: 5.6 10E9/L (ref 1.6–8.3)
NEUTROPHILS NFR BLD AUTO: 66.5 %
NRBC # BLD AUTO: 0 10*3/UL
NRBC BLD AUTO-RTO: 0 /100
PLATELET # BLD AUTO: 200 10E9/L (ref 150–450)
POTASSIUM SERPL-SCNC: 3.4 MMOL/L (ref 3.4–5.3)
PROT SERPL-MCNC: 8.2 G/DL (ref 6.8–8.8)
RBC # BLD AUTO: 4.93 10E12/L (ref 4.4–5.9)
SODIUM SERPL-SCNC: 134 MMOL/L (ref 133–144)
TROPONIN I SERPL-MCNC: <0.015 UG/L (ref 0–0.04)
WBC # BLD AUTO: 8.5 10E9/L (ref 4–11)

## 2019-03-21 PROCEDURE — 36415 COLL VENOUS BLD VENIPUNCTURE: CPT | Performed by: EMERGENCY MEDICINE

## 2019-03-21 PROCEDURE — 99285 EMERGENCY DEPT VISIT HI MDM: CPT | Mod: Z6 | Performed by: EMERGENCY MEDICINE

## 2019-03-21 PROCEDURE — 85025 COMPLETE CBC W/AUTO DIFF WBC: CPT | Performed by: EMERGENCY MEDICINE

## 2019-03-21 PROCEDURE — 71046 X-RAY EXAM CHEST 2 VIEWS: CPT | Mod: TC

## 2019-03-21 PROCEDURE — 93010 ELECTROCARDIOGRAM REPORT: CPT | Performed by: INTERNAL MEDICINE

## 2019-03-21 PROCEDURE — 25000128 H RX IP 250 OP 636: Performed by: EMERGENCY MEDICINE

## 2019-03-21 PROCEDURE — 84484 ASSAY OF TROPONIN QUANT: CPT | Performed by: EMERGENCY MEDICINE

## 2019-03-21 PROCEDURE — 83605 ASSAY OF LACTIC ACID: CPT | Mod: 91 | Performed by: EMERGENCY MEDICINE

## 2019-03-21 PROCEDURE — 80053 COMPREHEN METABOLIC PANEL: CPT | Performed by: EMERGENCY MEDICINE

## 2019-03-21 PROCEDURE — 96375 TX/PRO/DX INJ NEW DRUG ADDON: CPT

## 2019-03-21 PROCEDURE — 93005 ELECTROCARDIOGRAM TRACING: CPT

## 2019-03-21 PROCEDURE — 96374 THER/PROPH/DIAG INJ IV PUSH: CPT

## 2019-03-21 PROCEDURE — 83690 ASSAY OF LIPASE: CPT | Performed by: EMERGENCY MEDICINE

## 2019-03-21 PROCEDURE — 96361 HYDRATE IV INFUSION ADD-ON: CPT

## 2019-03-21 PROCEDURE — 99285 EMERGENCY DEPT VISIT HI MDM: CPT | Mod: 25

## 2019-03-21 RX ORDER — ONDANSETRON 2 MG/ML
4 INJECTION INTRAMUSCULAR; INTRAVENOUS ONCE
Status: COMPLETED | OUTPATIENT
Start: 2019-03-21 | End: 2019-03-21

## 2019-03-21 RX ORDER — MECLIZINE HCL 12.5 MG 12.5 MG/1
12.5 TABLET ORAL 4 TIMES DAILY PRN
Qty: 30 TABLET | Refills: 0 | Status: SHIPPED | OUTPATIENT
Start: 2019-03-21 | End: 2020-01-01

## 2019-03-21 RX ORDER — SODIUM CHLORIDE 9 MG/ML
1000 INJECTION, SOLUTION INTRAVENOUS CONTINUOUS
Status: DISCONTINUED | OUTPATIENT
Start: 2019-03-21 | End: 2019-03-21 | Stop reason: HOSPADM

## 2019-03-21 RX ORDER — LORAZEPAM 2 MG/ML
2 INJECTION INTRAMUSCULAR ONCE
Status: COMPLETED | OUTPATIENT
Start: 2019-03-21 | End: 2019-03-21

## 2019-03-21 RX ORDER — AZITHROMYCIN 500 MG/1
500 TABLET, FILM COATED ORAL DAILY
Qty: 3 TABLET | Refills: 0 | Status: SHIPPED | OUTPATIENT
Start: 2019-03-21 | End: 2020-01-01

## 2019-03-21 RX ORDER — SODIUM CHLORIDE 9 MG/ML
INJECTION, SOLUTION INTRAVENOUS CONTINUOUS
Status: DISCONTINUED | OUTPATIENT
Start: 2019-03-21 | End: 2019-03-21 | Stop reason: HOSPADM

## 2019-03-21 RX ADMIN — ONDANSETRON 4 MG: 2 INJECTION INTRAMUSCULAR; INTRAVENOUS at 19:27

## 2019-03-21 RX ADMIN — SODIUM CHLORIDE 1000 ML: 9 INJECTION, SOLUTION INTRAVENOUS at 19:27

## 2019-03-21 RX ADMIN — SODIUM CHLORIDE 1000 ML: 9 INJECTION, SOLUTION INTRAVENOUS at 20:46

## 2019-03-21 RX ADMIN — LORAZEPAM 2 MG: 2 INJECTION INTRAMUSCULAR; INTRAVENOUS at 20:04

## 2019-03-21 ASSESSMENT — ENCOUNTER SYMPTOMS
SHORTNESS OF BREATH: 0
ABDOMINAL PAIN: 0
DIZZINESS: 1
FEVER: 0

## 2019-03-21 NOTE — ED TRIAGE NOTES
Pt came into triage c/o chest pain since 1730 with a heart rate of 136 and hyperventelating. Pt states for 2 days he has had nausea and vomiting. When brought back to room heart rate decreased to 109 and chest pain stopped. C/O numbness and tingling in arms and legs and feeling nauseated. Pt instructed on slowing his breathing.

## 2019-03-21 NOTE — ED AVS SNAPSHOT
[facility]  [address] [address]  Phone:  [phone]                                    Shahrzad Walters   MRN: [id number]    Department:  [facility]   Date of Visit:  3/21/2019           After Visit Summary Signature Page    I have received my discharge instructions, and my questions have been answered. I have discussed any challenges I see with this plan with the nurse or doctor.    ..........................................................................................................................................  Patient/Patient Representative Signature      ..........................................................................................................................................  Patient Representative Print Name and Relationship to Patient    ..................................................               ................................................  Date                                   Time    ..........................................................................................................................................  Reviewed by Signature/Title    ...................................................              ..............................................  Date                                               Time          22EPIC Rev 08/18

## 2019-03-22 NOTE — ED NOTES
DATE:  3/21/2019   TIME OF RECEIPT FROM LAB:  1918  LAB TEST:  Lactic acid   LAB VALUE: 4.3  RESULTS GIVEN WITH READ-BACK TO (PROVIDER):  Dr. Fitzpatrick   TIME LAB VALUE REPORTED TO PROVIDER:  1920

## 2019-03-22 NOTE — ED PROVIDER NOTES
History     Chief Complaint   Patient presents with     Chest Pain     Fever     Nausea & Vomiting     HPI  Shahrzad Walters is a 48 year old male who presents to the emergency department accompanied by significant other.  He has been unwell for the last 3 days with upper respiratory symptoms but today he developed left-sided pleuritic chest pain.  He is also feeling very dizzy especially with moving head from side to side.  He has vomited several times today.  No fever, chills or diarrhea.  No shortness of breath, wheezing or diaphoresis.    Allergies:  Allergies   Allergen Reactions     Bee Venom Anaphylaxis       Problem List:    Patient Active Problem List    Diagnosis Date Noted     Anxiety 02/12/2018     Priority: Medium     Tobacco abuse 08/02/2017     Priority: Medium     Recurrent major depressive disorder, in full remission (H) 08/02/2017     Priority: Medium     Pure hypercholesterolemia 08/02/2017     Priority: Medium     Obesity (BMI 35.0-39.9 without comorbidity) 08/02/2017     Priority: Medium     Essential hypertension 06/14/2017     Priority: Medium     Alcohol abuse 12/30/2015     Priority: Medium        Past Medical History:    Past Medical History:   Diagnosis Date     Anxiety      Depressive disorder        Past Surgical History:    No past surgical history on file.    Family History:    No family history on file.    Social History:  Marital Status:   [4]  Social History     Tobacco Use     Smoking status: Current Every Day Smoker     Packs/day: 1.00   Substance Use Topics     Alcohol use: Yes     Comment: 1pt of vodka and 1/2 pint of schnaps daily. worse on weekends     Drug use: No        Medications:      azithromycin (ZITHROMAX) 500 MG tablet   meclizine (ANTIVERT) 12.5 MG tablet   atorvastatin (LIPITOR) 20 MG tablet   citalopram (CELEXA) 20 MG tablet   DULoxetine HCl (CYMBALTA PO)   EPINEPHrine (EPIPEN) 0.3 MG/0.3ML injection   hydrochlorothiazide (MICROZIDE) 12.5 MG capsule    lisinopril (PRINIVIL/ZESTRIL) 5 MG tablet         Review of Systems   Constitutional: Negative for fever.   Respiratory: Negative for shortness of breath.    Cardiovascular: Negative for chest pain.   Gastrointestinal: Negative for abdominal pain.   Neurological: Positive for dizziness.   All other systems reviewed and are negative.      Physical Exam   BP: (!) 197/105  Pulse: 83  Heart Rate: 103  Temp: 97.5  F (36.4  C)  Resp: (!) 32  Weight: 136.1 kg (300 lb)  SpO2: 100 %      Physical Exam   Constitutional: He is oriented to person, place, and time. He appears distressed.   Sick looking   HENT:   Head: Normocephalic and atraumatic.   Mouth/Throat: Oropharynx is clear and moist.   Eyes: EOM are normal. Pupils are equal, round, and reactive to light.   Cardiovascular: Regular rhythm, normal heart sounds and intact distal pulses. Tachycardia present.   Pulmonary/Chest: Effort normal and breath sounds normal. No stridor. No respiratory distress.   Abdominal: Bowel sounds are normal. He exhibits no distension. There is no tenderness.   Musculoskeletal: He exhibits no edema, tenderness or deformity.   Neurological: He is alert and oriented to person, place, and time. No cranial nerve deficit.   Skin: He is not diaphoretic.   Nursing note and vitals reviewed.      ED Course        Procedures  EKG: Sinus rhythm with premature atrial complexes.      Results for orders placed or performed during the hospital encounter of 03/21/19 (from the past 24 hour(s))   CBC with platelets differential   Result Value Ref Range    WBC 8.5 4.0 - 11.0 10e9/L    RBC Count 4.93 4.4 - 5.9 10e12/L    Hemoglobin 17.2 13.3 - 17.7 g/dL    Hematocrit 47.9 40.0 - 53.0 %    MCV 97 78 - 100 fl    MCH 34.9 (H) 26.5 - 33.0 pg    MCHC 35.9 31.5 - 36.5 g/dL    RDW 13.2 10.0 - 15.0 %    Platelet Count 200 150 - 450 10e9/L    Diff Method Automated Method     % Neutrophils 66.5 %    % Lymphocytes 22.5 %    % Monocytes 9.6 %    % Eosinophils 0.7 %    %  Basophils 0.5 %    % Immature Granulocytes 0.2 %    Nucleated RBCs 0 0 /100    Absolute Neutrophil 5.6 1.6 - 8.3 10e9/L    Absolute Lymphocytes 1.9 0.8 - 5.3 10e9/L    Absolute Monocytes 0.8 0.0 - 1.3 10e9/L    Absolute Eosinophils 0.1 0.0 - 0.7 10e9/L    Absolute Basophils 0.0 0.0 - 0.2 10e9/L    Abs Immature Granulocytes 0.0 0 - 0.4 10e9/L    Absolute Nucleated RBC 0.0    Comprehensive metabolic panel   Result Value Ref Range    Sodium 134 133 - 144 mmol/L    Potassium 3.4 3.4 - 5.3 mmol/L    Chloride 98 94 - 109 mmol/L    Carbon Dioxide 23 20 - 32 mmol/L    Anion Gap 13 3 - 14 mmol/L    Glucose 114 (H) 70 - 99 mg/dL    Urea Nitrogen 8 7 - 30 mg/dL    Creatinine 0.92 0.66 - 1.25 mg/dL    GFR Estimate >90 >60 mL/min/[1.73_m2]    GFR Estimate If Black >90 >60 mL/min/[1.73_m2]    Calcium 9.3 8.5 - 10.1 mg/dL    Bilirubin Total 0.6 0.2 - 1.3 mg/dL    Albumin 4.2 3.4 - 5.0 g/dL    Protein Total 8.2 6.8 - 8.8 g/dL    Alkaline Phosphatase 90 40 - 150 U/L    ALT 83 (H) 0 - 70 U/L    AST 76 (H) 0 - 45 U/L   Lipase   Result Value Ref Range    Lipase 48 (L) 73 - 393 U/L   Lactic acid whole blood   Result Value Ref Range    Lactic Acid 4.3 (HH) 0.7 - 2.0 mmol/L   Troponin I   Result Value Ref Range    Troponin I ES <0.015 0.000 - 0.045 ug/L   XR Chest 2 Views    Narrative    XR CHEST 2 VW    HISTORY: 48 years Male Left-sided chest pain    COMPARISON: None    TECHNIQUE: 2 views of the chest were obtained.    FINDINGS: Two views of the chest were obtained. Heart size and  pulmonary vascularity are within normal limits, lungs are clear on  both views. No consolidating air space opacities are present.          Impression    IMPRESSION: Clear chest.    SATINDER WIN MD   Lactic acid whole blood   Result Value Ref Range    Lactic Acid 1.3 0.7 - 2.0 mmol/L       Medications   ondansetron (ZOFRAN) injection 4 mg (4 mg Intravenous Given 3/21/19 1927)   0.9% sodium chloride BOLUS (0 mLs Intravenous Stopped 3/21/19 2046)      Followed by   0.9% sodium chloride BOLUS (0 mLs Intravenous Stopped 3/21/19 2149)   LORazepam (ATIVAN) injection 2 mg (2 mg Intravenous Given 3/21/19 2004)       Assessments & Plan (with Medical Decision Making)   Benign paroxysmal positional vertigo: Patient started on IV fluid hydration and received at least 2 L of IV normal saline.  Also received IV Zofran with some improvement of symptoms.  Ativan 1 mg IV given.  Will discharge home on meclizine.  If no improvement in the next few days then may follow-up with PCP for referral for Epley's maneuvers.    Pleuritic chest pain: Normal EKG and chest x-ray.  Encouraged to take OTC Motrin or Tylenol as needed for pain.    Left acute otitis media: Started on Zithromax for the next 3 days.  Encourage push fluids.  Laboratory test done showed normal CBC, CMP, lipase, troponin.  First lactic acid was elevated 4.3 but after hydration with normal saline 2 L repeat lactic acid was normal.  Discharged home in stable condition.  Return to ED if condition deteriorates.    I have reviewed the nursing notes.    I have reviewed the findings, diagnosis, plan and need for follow up with the patient.       Medication List      Started    azithromycin 500 MG tablet  Commonly known as:  ZITHROMAX  500 mg, Oral, DAILY     meclizine 12.5 MG tablet  Commonly known as:  ANTIVERT  12.5 mg, Oral, 4 TIMES DAILY PRN            Final diagnoses:   Benign paroxysmal positional vertigo of left ear   Acute left otitis media       3/21/2019   [facility]     Emir Fitzpatrick MD  03/22/19 5094

## 2019-03-22 NOTE — ED NOTES
"Hx ETOH use. Pt states no ETOH for a couple of days. States \"I'm not feeling any better.\" tremors noted. Anxious. Updated MD see new orders.   "

## 2019-03-22 NOTE — ED NOTES
"C/o nausea and vomiting x 2 days. C/o chest pain today on left anterior chest. \"I thought I was having a heart attack.\" flushed. Skin warm to the touch. Afebrile. Warm blanket given. C/o nausea. zofran IV given. Bolus infusing. Hx hypertension.   "

## 2019-08-07 ENCOUNTER — TRANSFERRED RECORDS (OUTPATIENT)
Dept: HEALTH INFORMATION MANAGEMENT | Facility: CLINIC | Age: 49
End: 2019-08-07

## 2019-08-07 LAB
CREAT SERPL-MCNC: 0.94 MG/DL (ref 0.7–1.2)
GFR SERPL CREATININE-BSD FRML MDRD: >60 ML/MIN/1.73M2
GLUCOSE SERPL-MCNC: 103 MG/DL (ref 70–99)
POTASSIUM SERPL-SCNC: 4.5 MEQ/L (ref 3.4–5.1)

## 2019-08-09 ENCOUNTER — TRANSFERRED RECORDS (OUTPATIENT)
Dept: HEALTH INFORMATION MANAGEMENT | Facility: CLINIC | Age: 49
End: 2019-08-09

## 2019-09-06 ENCOUNTER — OFFICE VISIT (OUTPATIENT)
Dept: CHIROPRACTIC MEDICINE | Facility: OTHER | Age: 49
End: 2019-09-06
Attending: CHIROPRACTOR
Payer: COMMERCIAL

## 2019-09-06 DIAGNOSIS — M99.01 SEGMENTAL AND SOMATIC DYSFUNCTION OF CERVICAL REGION: ICD-10-CM

## 2019-09-06 DIAGNOSIS — M54.50 ACUTE BILATERAL LOW BACK PAIN WITHOUT SCIATICA: ICD-10-CM

## 2019-09-06 DIAGNOSIS — M99.02 SEGMENTAL AND SOMATIC DYSFUNCTION OF THORACIC REGION: ICD-10-CM

## 2019-09-06 DIAGNOSIS — M99.03 SEGMENTAL AND SOMATIC DYSFUNCTION OF LUMBAR REGION: Primary | ICD-10-CM

## 2019-09-06 PROCEDURE — 98941 CHIROPRACT MANJ 3-4 REGIONS: CPT | Mod: AT | Performed by: CHIROPRACTOR

## 2019-09-09 NOTE — PROGRESS NOTES
Subjective Finding:    Chief compalint: Patient presents with:  Back Pain  Neck Pain  , Pain Scale: 7/10, Intensity: sharp, Duration: 2 weeks, Radiating: no.    Date of injury:     Activities that the pain restricts:   Home/household/hobbies/social activities: yes.  Work duties: no.  Sleep: no.  Makes symptoms better:wakling  Makes symptoms worse: lumbar extension and lumbar flexion.  Have you seen anyone else for the symptoms? No.  Work related: no.  Automobile related injury: no.    Objective and Assessment:    Posture Analysis:   High shoulder: .  Head tilt: .  High iliac crest: .  Head carriage: neutral.  Thoracic Kyphosis: neutral.  Lumbar Lordosis: forward.    Lumbar Range of Motion: extension decreased, left lateral flexion decreased and right lateral flexion decreased.  Cervical Range of Motion: .  Thoracic Range of Motion: .  Extremity Range of Motion: .    Palpation:   Quad lumb: bilateral, referred pain: no    Segmental dysfunction pre-treatment and treatment area: C6, C7, T8, L4 and L5.    Assessment post-treatment:  Cervical: ROM increased.  Thoracic: ROM increased.  Lumbar: ROM increased.    Comments: .      Complicating Factors: .    Procedure(s):  [facility]:  [address] Chiropractic manipulative treatment 3-4 regions performed   Cervical: Diversified, See above for level, Supine, Thoracic: Diversified, See above for level, Prone and Lumbar: Diversified, See above for level, Side posture    Modalities:  None performed this visit    Therapeutic procedures:  None    Plan:  Treatment plan: PRN.  Instructed patient: stretch as instructed at visit and walk 10 minutes.  Short term goals: reduce pain.  Long term goals: restore normal function.  Prognosis: very good.

## 2019-11-11 ENCOUNTER — TRANSFERRED RECORDS (OUTPATIENT)
Dept: HEALTH INFORMATION MANAGEMENT | Facility: CLINIC | Age: 49
End: 2019-11-11

## 2019-12-23 NOTE — PROGRESS NOTES
Subjective     Shahrzad Walters is a 49 year old male who presents to clinic today for the following health issues:    HPI   New Patient/Transfer of Care  Depression and Anxiety Follow-Up    How are you doing with your depression since your last visit? Improved, celexa seems to work. Wouldn't mind seeing someone.     How are you doing with your anxiety since your last visit?  Improved     Are you having other symptoms that might be associated with depression or anxiety? No    Have you had a significant life event? OTHER:  this past summer     Do you have any concerns with your use of alcohol or other drugs? No  Also here just to establish care was at [facility].  He is concerned because he did have knee pain and occasionally it pops and clicks and has significant pain he did have an x-ray that showed no acute bony abnormality did undergo adequate physical therapy without improvement would like to get an MRI.  Patient also has a skin lesion on his left arm that was always a small nodule but now he has a couple little bumps around the area it itches.  No treatments applied.  Social History     Tobacco Use     Smoking status: Current Every Day Smoker     Packs/day: 1.00   Substance Use Topics     Alcohol use: Yes     Comment: 1pt of vodka and 1/2 pint of schnaps daily. worse on weekends     Drug use: No     PHQ 1/2/2020   PHQ-9 Total Score 2   Q9: Thoughts of better off dead/self-harm past 2 weeks Not at all     KIRSTEN-7 SCORE 1/2/2020   Total Score 2     Last PHQ-9 1/2/2020   1.  Little interest or pleasure in doing things 0   2.  Feeling down, depressed, or hopeless 0   3.  Trouble falling or staying asleep, or sleeping too much 0   4.  Feeling tired or having little energy 1   5.  Poor appetite or overeating 1   6.  Feeling bad about yourself 0   7.  Trouble concentrating 0   8.  Moving slowly or restless 0   Q9: Thoughts of better off dead/self-harm past 2 weeks 0   PHQ-9 Total Score 2   Difficulty at  work, home, or with people Not difficult at all     KIRSTEN-7  1/2/2020   1. Feeling nervous, anxious, or on edge 1   2. Not being able to stop or control worrying 0   3. Worrying too much about different things 0   4. Trouble relaxing 0   5. Being so restless that it is hard to sit still 0   6. Becoming easily annoyed or irritable 1   7. Feeling afraid, as if something awful might happen 0   KIRSTEN-7 Total Score 2   If you checked any problems, how difficult have they made it for you to do your work, take care of things at home, or get along with other people? Not difficult at all           PAST MEDICAL HISTORY:  Past Medical History:   Diagnosis Date     Anxiety      Depressive disorder        PAST SURGICAL HISTORY:  Past Surgical History:   Procedure Laterality Date     CARPAL TUNNEL RELEASE RT/LT Left 08/2019     RELEASE ULNAR NERVE (ELBOW) Left 08/2019       MEDICATIONS:  Prior to Admission medications    Medication Sig Start Date End Date Taking? Authorizing Provider   citalopram (CELEXA) 20 MG tablet Take 20 mg by mouth 5/22/18  Yes Reported, Patient   EPINEPHrine (EPIPEN) 0.3 MG/0.3ML injection Inject 0.3 mLs (0.3 mg) into the muscle once as needed for anaphylaxis 7/15/16  Yes Sudha Daniels MD   mometasone (ELOCON) 0.1 % external cream Apply topically daily 1/2/20  Yes TRENT Verdugo MD       ALLERGIES:     Allergies   Allergen Reactions     Bee Venom Anaphylaxis       ROS:  Constitutional, HEENT, cardiovascular, pulmonary, gi and gu systems are negative, except as otherwise noted.      EXAM:  /86   Pulse 65   Temp 97.1  F (36.2  C) (Tympanic)   Resp 18   Ht 1.829 m (6')   Wt (!) 162 kg (357 lb 3.2 oz)   SpO2 96%   BMI 48.45 kg/m   Body mass index is 48.45 kg/m .   GENERAL APPEARANCE: healthy, alert and no distress  EYES: Eyes grossly normal to inspection, PERRL and conjunctivae and sclerae normal  MS: Left knee slight effusion has tenderness the medial compartment  SKIN: Left forearm has a  nontender couple millimeter raised cystlike lesion with some surrounding very small 1 mm raised lesions the whole area is under 1 cm in diameter  Lab/ X-ray  No results found for this or any previous visit (from the past 24 hour(s)).    ASSESSMENT/PLAN:    ICD-10-CM    1. Chronic pain of left knee M25.562 MR Knee Left w/o Contrast    G89.29    2. Dermatitis L30.9 mometasone (ELOCON) 0.1 % external cream   3. Recurrent major depressive disorder, in full remission (H) F33.42    4. BMI 45.0-49.9, adult (H) Z68.42    Chronic left knee pain he is undergone adequate physical therapy and it continues to bother him.  We will get an MRI of the left knee and call him with results.  Has a nonspecific dermatitis left arm we will try Elocon cream if no resolution we can have surgery excise the area.  His depression is controlled.  His other issue is an elevated BMI he was wondering about gastric bypass some of his friends have done that and its been very helpful.  He is can talk to his friends and see with surgeon they saw and he would like to see that surgeon he will let us know we can put the referral in.      GENESIS Verdugo MD  January 2, 2020

## 2020-01-01 ENCOUNTER — TELEPHONE (OUTPATIENT)
Dept: FAMILY MEDICINE | Facility: OTHER | Age: 50
End: 2020-01-01

## 2020-01-01 ENCOUNTER — APPOINTMENT (OUTPATIENT)
Dept: GENERAL RADIOLOGY | Facility: HOSPITAL | Age: 50
End: 2020-01-01
Attending: EMERGENCY MEDICINE
Payer: COMMERCIAL

## 2020-01-01 ENCOUNTER — TRANSFERRED RECORDS (OUTPATIENT)
Dept: HEALTH INFORMATION MANAGEMENT | Facility: CLINIC | Age: 50
End: 2020-01-01

## 2020-01-01 ENCOUNTER — NURSE TRIAGE (OUTPATIENT)
Dept: FAMILY MEDICINE | Facility: OTHER | Age: 50
End: 2020-01-01

## 2020-01-01 ENCOUNTER — DOCUMENTATION ONLY (OUTPATIENT)
Dept: SLEEP MEDICINE | Facility: HOSPITAL | Age: 50
End: 2020-01-01

## 2020-01-01 ENCOUNTER — HOSPITAL ENCOUNTER (OUTPATIENT)
Dept: MRI IMAGING | Facility: HOSPITAL | Age: 50
Discharge: HOME | End: 2020-02-10
Attending: FAMILY MEDICINE | Admitting: FAMILY MEDICINE
Payer: COMMERCIAL

## 2020-01-01 ENCOUNTER — HEALTH MAINTENANCE LETTER (OUTPATIENT)
Age: 50
End: 2020-01-01

## 2020-01-01 ENCOUNTER — RESULTS ONLY (OUTPATIENT)
Dept: LAB | Age: 50
End: 2020-01-01

## 2020-01-01 ENCOUNTER — HOSPITAL ENCOUNTER (EMERGENCY)
Facility: HOSPITAL | Age: 50
Discharge: HOME | End: 2020-11-12
Attending: EMERGENCY MEDICINE | Admitting: EMERGENCY MEDICINE
Payer: COMMERCIAL

## 2020-01-01 ENCOUNTER — HOSPITAL ENCOUNTER (EMERGENCY)
Facility: HOSPITAL | Age: 50
Discharge: HOME | End: 2020-07-24
Attending: EMERGENCY MEDICINE | Admitting: EMERGENCY MEDICINE
Payer: COMMERCIAL

## 2020-01-01 ENCOUNTER — MYC REFILL (OUTPATIENT)
Dept: FAMILY MEDICINE | Facility: OTHER | Age: 50
End: 2020-01-01

## 2020-01-01 ENCOUNTER — MYC MEDICAL ADVICE (OUTPATIENT)
Dept: FAMILY MEDICINE | Facility: OTHER | Age: 50
End: 2020-01-01

## 2020-01-01 ENCOUNTER — VIRTUAL VISIT (OUTPATIENT)
Dept: FAMILY MEDICINE | Facility: OTHER | Age: 50
End: 2020-01-01
Attending: NURSE PRACTITIONER
Payer: COMMERCIAL

## 2020-01-01 ENCOUNTER — APPOINTMENT (OUTPATIENT)
Dept: GENERAL RADIOLOGY | Facility: HOSPITAL | Age: 50
End: 2020-01-01
Attending: NURSE PRACTITIONER
Payer: COMMERCIAL

## 2020-01-01 ENCOUNTER — HOSPITAL ENCOUNTER (EMERGENCY)
Facility: HOSPITAL | Age: 50
Discharge: HOME | End: 2020-09-29
Attending: NURSE PRACTITIONER | Admitting: NURSE PRACTITIONER
Payer: COMMERCIAL

## 2020-01-01 ENCOUNTER — OFFICE VISIT (OUTPATIENT)
Dept: FAMILY MEDICINE | Facility: OTHER | Age: 50
End: 2020-01-01
Attending: FAMILY MEDICINE
Payer: COMMERCIAL

## 2020-01-01 VITALS
RESPIRATION RATE: 16 BRPM | HEIGHT: 72 IN | OXYGEN SATURATION: 94 % | TEMPERATURE: 98 F | DIASTOLIC BLOOD PRESSURE: 80 MMHG | BODY MASS INDEX: 42.66 KG/M2 | HEART RATE: 79 BPM | WEIGHT: 315 LBS | SYSTOLIC BLOOD PRESSURE: 139 MMHG

## 2020-01-01 VITALS
HEART RATE: 105 BPM | RESPIRATION RATE: 20 BRPM | TEMPERATURE: 98.9 F | DIASTOLIC BLOOD PRESSURE: 110 MMHG | OXYGEN SATURATION: 97 % | SYSTOLIC BLOOD PRESSURE: 157 MMHG

## 2020-01-01 VITALS
BODY MASS INDEX: 42.66 KG/M2 | SYSTOLIC BLOOD PRESSURE: 165 MMHG | OXYGEN SATURATION: 94 % | TEMPERATURE: 97.2 F | RESPIRATION RATE: 22 BRPM | WEIGHT: 315 LBS | DIASTOLIC BLOOD PRESSURE: 100 MMHG | HEART RATE: 68 BPM | HEIGHT: 72 IN

## 2020-01-01 VITALS
OXYGEN SATURATION: 96 % | RESPIRATION RATE: 18 BRPM | HEART RATE: 65 BPM | HEIGHT: 72 IN | SYSTOLIC BLOOD PRESSURE: 138 MMHG | TEMPERATURE: 97.1 F | BODY MASS INDEX: 42.66 KG/M2 | WEIGHT: 315 LBS | DIASTOLIC BLOOD PRESSURE: 86 MMHG

## 2020-01-01 DIAGNOSIS — F33.42 RECURRENT MAJOR DEPRESSIVE DISORDER, IN FULL REMISSION (H): ICD-10-CM

## 2020-01-01 DIAGNOSIS — E83.42 HYPOMAGNESEMIA: ICD-10-CM

## 2020-01-01 DIAGNOSIS — R11.2 NAUSEA AND VOMITING, INTRACTABILITY OF VOMITING NOT SPECIFIED, UNSPECIFIED VOMITING TYPE: Primary | ICD-10-CM

## 2020-01-01 DIAGNOSIS — F33.42 RECURRENT MAJOR DEPRESSIVE DISORDER, IN FULL REMISSION (H): Primary | ICD-10-CM

## 2020-01-01 DIAGNOSIS — M25.562 CHRONIC PAIN OF LEFT KNEE: Primary | ICD-10-CM

## 2020-01-01 DIAGNOSIS — R40.0 HAS DAYTIME DROWSINESS: ICD-10-CM

## 2020-01-01 DIAGNOSIS — F10.939 ALCOHOL WITHDRAWAL (H): ICD-10-CM

## 2020-01-01 DIAGNOSIS — F10.10 ALCOHOL ABUSE: Primary | ICD-10-CM

## 2020-01-01 DIAGNOSIS — E87.6 HYPOKALEMIA: ICD-10-CM

## 2020-01-01 DIAGNOSIS — R06.83 SNORING: ICD-10-CM

## 2020-01-01 DIAGNOSIS — I10 ESSENTIAL HYPERTENSION: ICD-10-CM

## 2020-01-01 DIAGNOSIS — G89.29 CHRONIC PAIN OF LEFT KNEE: ICD-10-CM

## 2020-01-01 DIAGNOSIS — G89.29 CHRONIC PAIN OF LEFT KNEE: Primary | ICD-10-CM

## 2020-01-01 DIAGNOSIS — L30.9 DERMATITIS: ICD-10-CM

## 2020-01-01 DIAGNOSIS — R06.83 SNORING: Primary | ICD-10-CM

## 2020-01-01 DIAGNOSIS — R45.851 SUICIDAL THOUGHTS: ICD-10-CM

## 2020-01-01 DIAGNOSIS — R06.81 APNEA: ICD-10-CM

## 2020-01-01 DIAGNOSIS — F10.10 ALCOHOL ABUSE: ICD-10-CM

## 2020-01-01 DIAGNOSIS — Z72.0 TOBACCO ABUSE: ICD-10-CM

## 2020-01-01 DIAGNOSIS — I10 HYPERTENSION, UNSPECIFIED TYPE: ICD-10-CM

## 2020-01-01 DIAGNOSIS — F10.129 ALCOHOL ABUSE WITH INTOXICATION (H): Primary | ICD-10-CM

## 2020-01-01 DIAGNOSIS — M25.562 CHRONIC PAIN OF LEFT KNEE: ICD-10-CM

## 2020-01-01 LAB
ALBUMIN SERPL-MCNC: 4.3 G/DL (ref 3.4–5)
ALBUMIN SERPL-MCNC: 4.3 G/DL (ref 3.4–5)
ALP SERPL-CCNC: 83 U/L (ref 40–150)
ALP SERPL-CCNC: 93 U/L (ref 40–150)
ALT SERPL W P-5'-P-CCNC: 157 U/L (ref 0–70)
ALT SERPL W P-5'-P-CCNC: 79 U/L (ref 0–70)
ALT SERPL-CCNC: 74 IU/L (ref 6–40)
ANION GAP SERPL CALCULATED.3IONS-SCNC: 15 MMOL/L (ref 3–14)
ANION GAP SERPL CALCULATED.3IONS-SCNC: 18 MMOL/L (ref 3–14)
ANION GAP SERPL CALCULATED.3IONS-SCNC: 9 MMOL/L (ref 3–14)
APAP SERPL-MCNC: <2 MG/L (ref 10–20)
AST SERPL W P-5'-P-CCNC: 179 U/L (ref 0–45)
AST SERPL W P-5'-P-CCNC: 89 U/L (ref 0–45)
AST SERPL-CCNC: 87 IU/L (ref 10–40)
BASOPHILS # BLD AUTO: 0 10E9/L (ref 0–0.2)
BASOPHILS NFR BLD AUTO: 0.4 %
BASOPHILS NFR BLD AUTO: 0.4 %
BASOPHILS NFR BLD AUTO: 0.5 %
BILIRUB DIRECT SERPL-MCNC: 0.5 MG/DL (ref 0–0.2)
BILIRUB SERPL-MCNC: 0.7 MG/DL (ref 0.2–1.3)
BILIRUB SERPL-MCNC: 1.2 MG/DL (ref 0.2–1.3)
BUN SERPL-MCNC: 10 MG/DL (ref 7–30)
BUN SERPL-MCNC: 7 MG/DL (ref 7–30)
BUN SERPL-MCNC: 9 MG/DL (ref 7–30)
CALCIUM SERPL-MCNC: 10.5 MG/DL (ref 8.5–10.1)
CALCIUM SERPL-MCNC: 9.3 MG/DL (ref 8.5–10.1)
CALCIUM SERPL-MCNC: 9.4 MG/DL (ref 8.5–10.1)
CHLORIDE SERPL-SCNC: 90 MMOL/L (ref 94–109)
CHLORIDE SERPL-SCNC: 94 MMOL/L (ref 94–109)
CHLORIDE SERPL-SCNC: 95 MMOL/L (ref 94–109)
CO2 SERPL-SCNC: 22 MMOL/L (ref 20–32)
CO2 SERPL-SCNC: 24 MMOL/L (ref 20–32)
CO2 SERPL-SCNC: 29 MMOL/L (ref 20–32)
CREAT SERPL-MCNC: 0.73 MG/DL (ref 0.66–1.25)
CREAT SERPL-MCNC: 0.81 MG/DL (ref 0.66–1.25)
CREAT SERPL-MCNC: 0.82 MG/DL (ref 0.66–1.25)
CREAT SERPL-MCNC: 0.92 MG/DL (ref 0.7–1.2)
DIFFERENTIAL METHOD BLD: ABNORMAL
EOSINOPHIL # BLD AUTO: 0 10E9/L (ref 0–0.7)
EOSINOPHIL # BLD AUTO: 0 10E9/L (ref 0–0.7)
EOSINOPHIL # BLD AUTO: 0.1 10E9/L (ref 0–0.7)
EOSINOPHIL NFR BLD AUTO: 0.2 %
EOSINOPHIL NFR BLD AUTO: 0.5 %
EOSINOPHIL NFR BLD AUTO: 1.8 %
ERYTHROCYTE [DISTWIDTH] IN BLOOD BY AUTOMATED COUNT: 13.1 % (ref 10–15)
ERYTHROCYTE [DISTWIDTH] IN BLOOD BY AUTOMATED COUNT: 13.6 % (ref 10–15)
ERYTHROCYTE [DISTWIDTH] IN BLOOD BY AUTOMATED COUNT: 14.5 % (ref 10–15)
ETHANOL SERPL-MCNC: 0.16 G/DL
ETHANOL SERPL-MCNC: <0.01 G/DL
GFR SERPL CREATININE-BSD FRML MDRD: >60 ML/MIN/1.73M*2
GFR SERPL CREATININE-BSD FRML MDRD: >90 ML/MIN/{1.73_M2}
GLUCOSE SERPL-MCNC: 110 MG/DL (ref 70–99)
GLUCOSE SERPL-MCNC: 134 MG/DL (ref 70–99)
GLUCOSE SERPL-MCNC: 185 MG/DL (ref 70–99)
GLUCOSE SERPL-MCNC: 82 MG/DL (ref 70–99)
HCT VFR BLD AUTO: 47.7 % (ref 40–53)
HCT VFR BLD AUTO: 47.8 % (ref 40–53)
HCT VFR BLD AUTO: 48.9 % (ref 40–53)
HGB BLD-MCNC: 16.8 G/DL (ref 13.3–17.7)
HGB BLD-MCNC: 17.2 G/DL (ref 13.3–17.7)
HGB BLD-MCNC: 17.7 G/DL (ref 13.3–17.7)
IMM GRANULOCYTES # BLD: 0 10E9/L (ref 0–0.4)
IMM GRANULOCYTES NFR BLD: 0.4 %
IMM GRANULOCYTES NFR BLD: 0.6 %
IMM GRANULOCYTES NFR BLD: 0.7 %
LABORATORY COMMENT REPORT: NORMAL
LABORATORY COMMENT REPORT: NORMAL
LIPASE SERPL-CCNC: 40 U/L (ref 73–393)
LYMPHOCYTES # BLD AUTO: 0.6 10E9/L (ref 0.8–5.3)
LYMPHOCYTES # BLD AUTO: 1 10E9/L (ref 0.8–5.3)
LYMPHOCYTES # BLD AUTO: 1.4 10E9/L (ref 0.8–5.3)
LYMPHOCYTES NFR BLD AUTO: 12.4 %
LYMPHOCYTES NFR BLD AUTO: 17.6 %
LYMPHOCYTES NFR BLD AUTO: 22.1 %
MAGNESIUM SERPL-MCNC: 1.2 MG/DL (ref 1.6–2.3)
MAGNESIUM SERPL-MCNC: 1.8 MG/DL (ref 1.6–2.3)
MCH RBC QN AUTO: 33.3 PG (ref 26.5–33)
MCH RBC QN AUTO: 34.8 PG (ref 26.5–33)
MCH RBC QN AUTO: 35.4 PG (ref 26.5–33)
MCHC RBC AUTO-ENTMCNC: 35.1 G/DL (ref 31.5–36.5)
MCHC RBC AUTO-ENTMCNC: 36.1 G/DL (ref 31.5–36.5)
MCHC RBC AUTO-ENTMCNC: 36.2 G/DL (ref 31.5–36.5)
MCV RBC AUTO: 95 FL (ref 78–100)
MCV RBC AUTO: 96 FL (ref 78–100)
MCV RBC AUTO: 98 FL (ref 78–100)
MONOCYTES # BLD AUTO: 0.4 10E9/L (ref 0–1.3)
MONOCYTES # BLD AUTO: 0.6 10E9/L (ref 0–1.3)
MONOCYTES # BLD AUTO: 0.9 10E9/L (ref 0–1.3)
MONOCYTES NFR BLD AUTO: 10.7 %
MONOCYTES NFR BLD AUTO: 12.9 %
MONOCYTES NFR BLD AUTO: 7.3 %
NEUTROPHILS # BLD AUTO: 2.7 10E9/L (ref 1.6–8.3)
NEUTROPHILS # BLD AUTO: 4.1 10E9/L (ref 1.6–8.3)
NEUTROPHILS # BLD AUTO: 5.6 10E9/L (ref 1.6–8.3)
NEUTROPHILS NFR BLD AUTO: 62 %
NEUTROPHILS NFR BLD AUTO: 70.4 %
NEUTROPHILS NFR BLD AUTO: 79.1 %
NRBC # BLD AUTO: 0 10*3/UL
NRBC BLD AUTO-RTO: 0 /100
PLATELET # BLD AUTO: 141 10E9/L (ref 150–450)
PLATELET # BLD AUTO: 165 10E9/L (ref 150–450)
PLATELET # BLD AUTO: 205 10E9/L (ref 150–450)
POTASSIUM SERPL-SCNC: 3.3 MMOL/L (ref 3.4–5.3)
POTASSIUM SERPL-SCNC: 3.5 MMOL/L (ref 3.4–5.3)
POTASSIUM SERPL-SCNC: 3.6 MMOL/L (ref 3.4–5.3)
POTASSIUM SERPL-SCNC: 3.8 MEQ/L (ref 3.4–5.1)
PROT SERPL-MCNC: 8.4 G/DL (ref 6.8–8.8)
PROT SERPL-MCNC: 8.8 G/DL (ref 6.8–8.8)
RBC # BLD AUTO: 4.86 10E12/L (ref 4.4–5.9)
RBC # BLD AUTO: 5.05 10E12/L (ref 4.4–5.9)
RBC # BLD AUTO: 5.08 10E12/L (ref 4.4–5.9)
SALICYLATES SERPL-MCNC: 2 MG/DL
SARS-COV-2 RNA SPEC QL NAA+PROBE: NEGATIVE
SARS-COV-2 RNA SPEC QL NAA+PROBE: NEGATIVE
SODIUM SERPL-SCNC: 130 MMOL/L (ref 133–144)
SODIUM SERPL-SCNC: 132 MMOL/L (ref 133–144)
SODIUM SERPL-SCNC: 134 MMOL/L (ref 133–144)
SPECIMEN SOURCE: NORMAL
SPECIMEN SOURCE: NORMAL
TROPONIN I SERPL-MCNC: <0.015 UG/L (ref 0–0.04)
TSH SERPL DL<=0.005 MIU/L-ACNC: 1.04 MU/L (ref 0.4–4)
WBC # BLD AUTO: 4.3 10E9/L (ref 4–11)
WBC # BLD AUTO: 5.2 10E9/L (ref 4–11)
WBC # BLD AUTO: 8 10E9/L (ref 4–11)

## 2020-01-01 PROCEDURE — 80329 ANALGESICS NON-OPIOID 1 OR 2: CPT | Performed by: EMERGENCY MEDICINE

## 2020-01-01 PROCEDURE — 250N000013 HC RX MED GY IP 250 OP 250 PS 637: Performed by: EMERGENCY MEDICINE

## 2020-01-01 PROCEDURE — C9113 INJ PANTOPRAZOLE SODIUM, VIA: HCPCS | Performed by: NURSE PRACTITIONER

## 2020-01-01 PROCEDURE — 80048 BASIC METABOLIC PNL TOTAL CA: CPT | Performed by: EMERGENCY MEDICINE

## 2020-01-01 PROCEDURE — 25000125 ZZHC RX 250: Performed by: NURSE PRACTITIONER

## 2020-01-01 PROCEDURE — 83735 ASSAY OF MAGNESIUM: CPT | Performed by: EMERGENCY MEDICINE

## 2020-01-01 PROCEDURE — 25000128 H RX IP 250 OP 636: Performed by: NURSE PRACTITIONER

## 2020-01-01 PROCEDURE — 83690 ASSAY OF LIPASE: CPT | Performed by: EMERGENCY MEDICINE

## 2020-01-01 PROCEDURE — 96365 THER/PROPH/DIAG IV INF INIT: CPT

## 2020-01-01 PROCEDURE — 85025 COMPLETE CBC W/AUTO DIFF WBC: CPT | Performed by: EMERGENCY MEDICINE

## 2020-01-01 PROCEDURE — 96375 TX/PRO/DX INJ NEW DRUG ADDON: CPT

## 2020-01-01 PROCEDURE — 99285 EMERGENCY DEPT VISIT HI MDM: CPT | Mod: 25

## 2020-01-01 PROCEDURE — C9803 HOPD COVID-19 SPEC COLLECT: HCPCS

## 2020-01-01 PROCEDURE — 80320 DRUG SCREEN QUANTALCOHOLS: CPT | Performed by: EMERGENCY MEDICINE

## 2020-01-01 PROCEDURE — 99285 EMERGENCY DEPT VISIT HI MDM: CPT | Performed by: EMERGENCY MEDICINE

## 2020-01-01 PROCEDURE — 36415 COLL VENOUS BLD VENIPUNCTURE: CPT | Performed by: NURSE PRACTITIONER

## 2020-01-01 PROCEDURE — 25000128 H RX IP 250 OP 636: Performed by: EMERGENCY MEDICINE

## 2020-01-01 PROCEDURE — 84484 ASSAY OF TROPONIN QUANT: CPT | Performed by: NURSE PRACTITIONER

## 2020-01-01 PROCEDURE — 93010 ELECTROCARDIOGRAM REPORT: CPT | Performed by: INTERNAL MEDICINE

## 2020-01-01 PROCEDURE — 93005 ELECTROCARDIOGRAM TRACING: CPT

## 2020-01-01 PROCEDURE — 25000132 ZZH RX MED GY IP 250 OP 250 PS 637: Performed by: NURSE PRACTITIONER

## 2020-01-01 PROCEDURE — 99285 EMERGENCY DEPT VISIT HI MDM: CPT | Mod: Z6 | Performed by: EMERGENCY MEDICINE

## 2020-01-01 PROCEDURE — 71045 X-RAY EXAM CHEST 1 VIEW: CPT | Mod: TC

## 2020-01-01 PROCEDURE — 73721 MRI JNT OF LWR EXTRE W/O DYE: CPT | Mod: TC,LT

## 2020-01-01 PROCEDURE — 96376 TX/PRO/DX INJ SAME DRUG ADON: CPT

## 2020-01-01 PROCEDURE — 99284 EMERGENCY DEPT VISIT MOD MDM: CPT | Mod: Z6 | Performed by: NURSE PRACTITIONER

## 2020-01-01 PROCEDURE — 36415 COLL VENOUS BLD VENIPUNCTURE: CPT | Performed by: EMERGENCY MEDICINE

## 2020-01-01 PROCEDURE — 96361 HYDRATE IV INFUSION ADD-ON: CPT

## 2020-01-01 PROCEDURE — 80320 DRUG SCREEN QUANTALCOHOLS: CPT | Performed by: NURSE PRACTITIONER

## 2020-01-01 PROCEDURE — 250N000011 HC RX IP 250 OP 636: Performed by: EMERGENCY MEDICINE

## 2020-01-01 PROCEDURE — 84484 ASSAY OF TROPONIN QUANT: CPT | Performed by: EMERGENCY MEDICINE

## 2020-01-01 PROCEDURE — 85025 COMPLETE CBC W/AUTO DIFF WBC: CPT | Performed by: NURSE PRACTITIONER

## 2020-01-01 PROCEDURE — 25800030 ZZH RX IP 258 OP 636: Performed by: NURSE PRACTITIONER

## 2020-01-01 PROCEDURE — 25800030 ZZH RX IP 258 OP 636: Performed by: EMERGENCY MEDICINE

## 2020-01-01 PROCEDURE — 84443 ASSAY THYROID STIM HORMONE: CPT | Performed by: NURSE PRACTITIONER

## 2020-01-01 PROCEDURE — 25000132 ZZH RX MED GY IP 250 OP 250 PS 637: Performed by: EMERGENCY MEDICINE

## 2020-01-01 PROCEDURE — 80048 BASIC METABOLIC PNL TOTAL CA: CPT | Performed by: NURSE PRACTITIONER

## 2020-01-01 PROCEDURE — 80053 COMPREHEN METABOLIC PANEL: CPT | Performed by: EMERGENCY MEDICINE

## 2020-01-01 PROCEDURE — 80076 HEPATIC FUNCTION PANEL: CPT | Performed by: NURSE PRACTITIONER

## 2020-01-01 PROCEDURE — 36415 COLL VENOUS BLD VENIPUNCTURE: CPT

## 2020-01-01 PROCEDURE — 25000125 ZZHC RX 250: Performed by: EMERGENCY MEDICINE

## 2020-01-01 PROCEDURE — 99203 OFFICE O/P NEW LOW 30 MIN: CPT | Performed by: FAMILY MEDICINE

## 2020-01-01 PROCEDURE — 71046 X-RAY EXAM CHEST 2 VIEWS: CPT

## 2020-01-01 PROCEDURE — 96374 THER/PROPH/DIAG INJ IV PUSH: CPT

## 2020-01-01 RX ORDER — LISINOPRIL 20 MG/1
20 TABLET ORAL DAILY
Qty: 30 TABLET | Refills: 1 | Status: SHIPPED | OUTPATIENT
Start: 2020-01-01

## 2020-01-01 RX ORDER — CITALOPRAM HYDROBROMIDE 40 MG/1
TABLET ORAL
Qty: 90 TABLET | Refills: 0 | Status: SHIPPED | OUTPATIENT
Start: 2020-01-01 | End: 2020-01-01

## 2020-01-01 RX ORDER — CLONIDINE HYDROCHLORIDE 0.1 MG/1
0.1 TABLET ORAL ONCE
Status: COMPLETED | OUTPATIENT
Start: 2020-01-01 | End: 2020-01-01

## 2020-01-01 RX ORDER — METOCLOPRAMIDE HYDROCHLORIDE 5 MG/ML
10 INJECTION INTRAMUSCULAR; INTRAVENOUS ONCE
Status: COMPLETED | OUTPATIENT
Start: 2020-01-01 | End: 2020-01-01

## 2020-01-01 RX ORDER — FOLIC ACID 1 MG/1
1 TABLET ORAL DAILY
Status: DISCONTINUED | OUTPATIENT
Start: 2020-01-01 | End: 2020-01-01 | Stop reason: HOSPADM

## 2020-01-01 RX ORDER — MAGNESIUM SULFATE HEPTAHYDRATE 500 MG/ML
2 INJECTION, SOLUTION INTRAMUSCULAR; INTRAVENOUS ONCE
Status: DISCONTINUED | OUTPATIENT
Start: 2020-01-01 | End: 2020-01-01

## 2020-01-01 RX ORDER — MULTIVITAMIN,THERAPEUTIC
1 TABLET ORAL DAILY
Status: DISCONTINUED | OUTPATIENT
Start: 2020-01-01 | End: 2020-01-01 | Stop reason: HOSPADM

## 2020-01-01 RX ORDER — LISINOPRIL 5 MG/1
20 TABLET ORAL ONCE
Status: COMPLETED | OUTPATIENT
Start: 2020-01-01 | End: 2020-01-01

## 2020-01-01 RX ORDER — CLONIDINE HYDROCHLORIDE 0.1 MG/1
0.1 TABLET ORAL 3 TIMES DAILY PRN
Qty: 15 TABLET | Refills: 0 | Status: SHIPPED | OUTPATIENT
Start: 2020-01-01 | End: 2020-01-01

## 2020-01-01 RX ORDER — LORAZEPAM 2 MG/ML
2 INJECTION INTRAMUSCULAR ONCE
Status: COMPLETED | OUTPATIENT
Start: 2020-01-01 | End: 2020-01-01

## 2020-01-01 RX ORDER — ONDANSETRON 4 MG/1
4 TABLET, ORALLY DISINTEGRATING ORAL EVERY 6 HOURS PRN
Qty: 10 TABLET | Refills: 0 | Status: SHIPPED | OUTPATIENT
Start: 2020-01-01 | End: 2020-01-01

## 2020-01-01 RX ORDER — ONDANSETRON 2 MG/ML
4 INJECTION INTRAMUSCULAR; INTRAVENOUS EVERY 30 MIN PRN
Status: DISCONTINUED | OUTPATIENT
Start: 2020-01-01 | End: 2020-01-01 | Stop reason: HOSPADM

## 2020-01-01 RX ORDER — CLONIDINE HYDROCHLORIDE 0.1 MG/1
0.1 TABLET ORAL 3 TIMES DAILY PRN
COMMUNITY

## 2020-01-01 RX ORDER — LORAZEPAM 2 MG/ML
1 INJECTION INTRAMUSCULAR ONCE
Status: COMPLETED | OUTPATIENT
Start: 2020-01-01 | End: 2020-01-01

## 2020-01-01 RX ORDER — MAGNESIUM SULFATE HEPTAHYDRATE 40 MG/ML
2 INJECTION, SOLUTION INTRAVENOUS ONCE
Status: COMPLETED | OUTPATIENT
Start: 2020-01-01 | End: 2020-01-01

## 2020-01-01 RX ORDER — ONDANSETRON 2 MG/ML
4 INJECTION INTRAMUSCULAR; INTRAVENOUS ONCE
Status: COMPLETED | OUTPATIENT
Start: 2020-01-01 | End: 2020-01-01

## 2020-01-01 RX ORDER — ONDANSETRON 4 MG/1
4 TABLET, ORALLY DISINTEGRATING ORAL EVERY 8 HOURS PRN
Qty: 10 TABLET | Refills: 0 | Status: SHIPPED | OUTPATIENT
Start: 2020-01-01 | End: 2020-01-01

## 2020-01-01 RX ORDER — CITALOPRAM HYDROBROMIDE 40 MG/1
40 TABLET ORAL DAILY
Qty: 90 TABLET | Refills: 0 | Status: SHIPPED | OUTPATIENT
Start: 2020-01-01

## 2020-01-01 RX ORDER — POTASSIUM CHLORIDE 1500 MG/1
40 TABLET, EXTENDED RELEASE ORAL ONCE
Status: COMPLETED | OUTPATIENT
Start: 2020-01-01 | End: 2020-01-01

## 2020-01-01 RX ORDER — LORAZEPAM 2 MG/ML
1 INJECTION INTRAMUSCULAR
Status: DISCONTINUED | OUTPATIENT
Start: 2020-01-01 | End: 2020-01-01 | Stop reason: HOSPADM

## 2020-01-01 RX ORDER — MOMETASONE FUROATE 1 MG/G
CREAM TOPICAL DAILY
Qty: 15 G | Refills: 1 | Status: SHIPPED | OUTPATIENT
Start: 2020-01-01 | End: 2020-01-01

## 2020-01-01 RX ADMIN — LISINOPRIL 20 MG: 5 TABLET ORAL at 09:43

## 2020-01-01 RX ADMIN — LORAZEPAM 1 MG: 2 INJECTION INTRAMUSCULAR; INTRAVENOUS at 06:45

## 2020-01-01 RX ADMIN — MAGNESIUM SULFATE IN WATER 2 G: 40 INJECTION, SOLUTION INTRAVENOUS at 10:12

## 2020-01-01 RX ADMIN — CLONIDINE HYDROCHLORIDE 0.1 MG: 0.1 TABLET ORAL at 10:58

## 2020-01-01 RX ADMIN — LIDOCAINE HYDROCHLORIDE: 20 SOLUTION ORAL; TOPICAL at 16:20

## 2020-01-01 RX ADMIN — ONDANSETRON 4 MG: 2 INJECTION INTRAMUSCULAR; INTRAVENOUS at 16:16

## 2020-01-01 RX ADMIN — PROCHLORPERAZINE EDISYLATE 10 MG: 5 INJECTION INTRAMUSCULAR; INTRAVENOUS at 09:45

## 2020-01-01 RX ADMIN — CLONIDINE HYDROCHLORIDE 0.1 MG: 0.1 TABLET ORAL at 17:51

## 2020-01-01 RX ADMIN — POTASSIUM CHLORIDE 40 MEQ: 1500 TABLET, EXTENDED RELEASE ORAL at 10:12

## 2020-01-01 RX ADMIN — Medication 100 MG: at 11:19

## 2020-01-01 RX ADMIN — LORAZEPAM 2 MG: 2 INJECTION INTRAMUSCULAR; INTRAVENOUS at 10:58

## 2020-01-01 RX ADMIN — MAGNESIUM SULFATE IN WATER 2 G: 40 INJECTION, SOLUTION INTRAVENOUS at 07:54

## 2020-01-01 RX ADMIN — ONDANSETRON 4 MG: 2 INJECTION INTRAMUSCULAR; INTRAVENOUS at 09:43

## 2020-01-01 RX ADMIN — PANTOPRAZOLE SODIUM 40 MG: 40 INJECTION, POWDER, FOR SOLUTION INTRAVENOUS at 16:17

## 2020-01-01 RX ADMIN — LORAZEPAM 2 MG: 2 INJECTION INTRAMUSCULAR; INTRAVENOUS at 16:13

## 2020-01-01 RX ADMIN — METOCLOPRAMIDE 10 MG: 5 INJECTION, SOLUTION INTRAMUSCULAR; INTRAVENOUS at 10:22

## 2020-01-01 RX ADMIN — ONDANSETRON 4 MG: 2 INJECTION INTRAMUSCULAR; INTRAVENOUS at 10:12

## 2020-01-01 RX ADMIN — LORAZEPAM 1 MG: 2 INJECTION INTRAMUSCULAR; INTRAVENOUS at 17:49

## 2020-01-01 RX ADMIN — ONDANSETRON 4 MG: 2 INJECTION INTRAMUSCULAR; INTRAVENOUS at 08:00

## 2020-01-01 RX ADMIN — DEXTROSE AND SODIUM CHLORIDE: 5; 900 INJECTION, SOLUTION INTRAVENOUS at 06:45

## 2020-01-01 RX ADMIN — SODIUM CHLORIDE 1000 ML: 9 INJECTION, SOLUTION INTRAVENOUS at 16:11

## 2020-01-01 RX ADMIN — LIDOCAINE HYDROCHLORIDE 30 ML: 20 SOLUTION ORAL; TOPICAL at 08:50

## 2020-01-01 SDOH — HEALTH STABILITY: MENTAL HEALTH: HOW OFTEN DO YOU HAVE A DRINK CONTAINING ALCOHOL?: NEVER

## 2020-01-01 ASSESSMENT — ENCOUNTER SYMPTOMS
FREQUENCY: 0
DYSURIA: 0
APPETITE CHANGE: 1
SHORTNESS OF BREATH: 0
DIZZINESS: 0
HEADACHES: 0
VOMITING: 1
RHINORRHEA: 0
LIGHT-HEADEDNESS: 0
NAUSEA: 1
PALPITATIONS: 0
FEVER: 0
DIARRHEA: 1
SLEEP DISTURBANCE: 1
NUMBNESS: 0
SORE THROAT: 0
CONSTIPATION: 0
ABDOMINAL PAIN: 0
WEAKNESS: 0
CHILLS: 0
COUGH: 0
DIFFICULTY URINATING: 0
NERVOUS/ANXIOUS: 1
SEIZURES: 0

## 2020-01-01 ASSESSMENT — PAIN SCALES - GENERAL: PAINLEVEL: MODERATE PAIN (4)

## 2020-01-01 ASSESSMENT — ANXIETY QUESTIONNAIRES
3. WORRYING TOO MUCH ABOUT DIFFERENT THINGS: NOT AT ALL
IF YOU CHECKED OFF ANY PROBLEMS ON THIS QUESTIONNAIRE, HOW DIFFICULT HAVE THESE PROBLEMS MADE IT FOR YOU TO DO YOUR WORK, TAKE CARE OF THINGS AT HOME, OR GET ALONG WITH OTHER PEOPLE: NOT DIFFICULT AT ALL
GAD7 TOTAL SCORE: 2
1. FEELING NERVOUS, ANXIOUS, OR ON EDGE: SEVERAL DAYS
5. BEING SO RESTLESS THAT IT IS HARD TO SIT STILL: NOT AT ALL
GAD7 TOTAL SCORE: 2
6. BECOMING EASILY ANNOYED OR IRRITABLE: SEVERAL DAYS
2. NOT BEING ABLE TO STOP OR CONTROL WORRYING: NOT AT ALL
4. TROUBLE RELAXING: NOT AT ALL
7. FEELING AFRAID AS IF SOMETHING AWFUL MIGHT HAPPEN: NOT AT ALL

## 2020-01-01 ASSESSMENT — MIFFLIN-ST. JEOR
SCORE: 2362.19
SCORE: 2480.59
SCORE: 2513.25

## 2020-01-01 ASSESSMENT — PATIENT HEALTH QUESTIONNAIRE - PHQ9: SUM OF ALL RESPONSES TO PHQ QUESTIONS 1-9: 2

## 2020-01-02 PROBLEM — R55 SYNCOPE AND COLLAPSE: Status: ACTIVE | Noted: 2018-06-18

## 2020-01-02 PROBLEM — G56.22 ULNAR NEUROPATHY AT ELBOW, LEFT: Status: ACTIVE | Noted: 2019-08-05

## 2020-01-02 PROBLEM — M25.562 ACUTE PAIN OF LEFT KNEE: Status: ACTIVE | Noted: 2018-07-05

## 2020-01-02 PROBLEM — G89.29 CHRONIC PAIN OF LEFT KNEE: Status: ACTIVE | Noted: 2018-07-05

## 2020-01-02 NOTE — NURSING NOTE
Chief Complaint   Patient presents with     Establish Care       Initial /86   Pulse 65   Temp 97.1  F (36.2  C) (Tympanic)   Resp 18   Ht 1.829 m (6')   Wt (!) 162 kg (357 lb 3.2 oz)   SpO2 96%   BMI 48.45 kg/m   Estimated body mass index is 48.45 kg/m  as calculated from the following:    Height as of this encounter: 1.829 m (6').    Weight as of this encounter: 162 kg (357 lb 3.2 oz).  Medication Reconciliation: complete  Ashely Partida LPN

## 2020-03-17 NOTE — PROGRESS NOTES
SAWYER ALFRED       Name: Shahrzad Walters MRN# [id number]   Age: 49 year old YOB: 1970     Stop Bang questionnaire completed with a score of >3 to allow for HST     Have you been told you snore loudly (louder than talking or loud enough to be heard through doors)? YES    Do you often feel tired, fatigued, or sleepy during the daytime? YES    Has anyone observed you stop breathing during your sleep? YES    Do you have or are you being treated for high blood pressure? NO    Is your BMI greater than 35? YES    Is your neck size circumference 16 inches or greater? NO    Are you over 50 years old? YES    Stop Bang Score (# of yes): 6
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
## 2020-03-30 NOTE — TELEPHONE ENCOUNTER
To: Nurse to Dr. Rafael Verdugo  Patient called to confirm that YES he will be here for his Pre Op appt with you on 04/02/2020 and that his surgery is still on with Dr. Cervantes set for 04/13/2020.  Thank you

## 2020-04-02 PROBLEM — E66.01 OBESITY, CLASS III, BMI 40-49.9 (MORBID OBESITY) (H): Status: ACTIVE | Noted: 2017-08-02

## 2020-04-14 NOTE — TELEPHONE ENCOUNTER
Patient calling and reports body aches, headache, fatigue, runny nose, weakness and no appetite for two days. Patient is unsure if he has a fever does not have a thermometer, but states he will be warm and then has chills. Patient denies difficulty breathing, chest pain, cough, sore throat, dizziness, vomiting, diarrhea or abdominal pain. Protocol advises home care. Care advice given per protocol. Patient verbalizes understanding and will call back or go to ER/UC with worsening symptoms.    Additional Information    Negative: Severe difficulty breathing (e.g., struggling for each breath, speaks in single words)    Negative: Bluish (or gray) lips or face now    Negative: Shock suspected (e.g., cold/pale/clammy skin, too weak to stand, low BP, rapid pulse)    Negative: Sounds like a life-threatening emergency to the triager    Negative: [1] Sounds like a cold AND [2] no fever    Negative: [1] Cough with sputum AND [2] no fever    Negative: [1] Dry cough (no sputum) sputum AND [2] no fever    Negative: [1] Throat pain AND [2] no fever    Negative: Influenza vaccine reaction is suspected    Negative: Chest pain  (Exception: MILD central chest pain, present only when coughing)    Negative: [1] Headache AND [2] stiff neck (can't touch chin to chest)    Negative: Fever > 104 F (40 C)    Negative: [1] Difficulty breathing AND [2] not severe AND [3] not from stuffy nose (e.g., not relieved by cleaning out the nose)    Negative: Patient sounds very sick or weak to the triager    Negative: [1] Fever > 101 F (38.3 C) AND [2] age > 60    Negative: [1] Fever > 100.0 F (37.8 C) AND [2] bedridden (e.g., nursing home patient, CVA, chronic illness, recovering from surgery)    Negative: [1] Fever > 100.0 F (37.8 C) AND [2] diabetes mellitus or weak immune system (e.g., HIV positive, cancer chemo, splenectomy, chronic steroids)    Negative: Patient is HIGH RISK (e.g., age > 64 years, pregnant, HIV+, or chronic medical condition)     "Negative: Fever present > 3 days (72 hours)    Negative: [1] Fever returns after gone for over 24 hours AND [2] symptoms worse or not improved    Negative: [1] Using nasal washes and pain medicine > 24 hours AND [2] sinus pain (around cheekbone or eye) persists    Negative: Earache    Negative: [1] Patient is NOT HIGH RISK AND [2] strongly requests antiviral medicine AND [3] flu symptoms present < 48 hours    Negative: [1] Nasal discharge AND [2] present > 10 days    Negative: Cough present > 3 weeks    Negative: [1] Probable influenza (fever) with no complications AND [2] NOT HIGH RISK    [1] Probable mild influenza (no fever) or a common cold, with no complications AND [2] NOT HIGH RISK    Answer Assessment - Initial Assessment Questions  1. WORST SYMPTOM: \"What is your worst symptom?\" (e.g., cough, runny nose, muscle aches, headache, sore throat, fever)       Nausea, body aches and fatigue  2. ONSET: \"When did your flu symptoms start?\"       2 days  3. COUGH: \"How bad is the cough?\"        No cough  4. RESPIRATORY DISTRESS: \"Describe your breathing.\"       Normal  5. FEVER: \"Do you have a fever?\" If so, ask: \"What is your temperature, how was it measured, and when did it start?\"      Unsure does not have a thermometer  6. EXPOSURE: \"Were you exposed to someone with influenza?\"        Unsure  7. FLU VACCINE: \"Did you get a flu shot this year?\"      No  8. HIGH RISK DISEASE: \"Do you any chronic medical problems?\" (e.g., heart or lung disease, asthma, weak immune system, or other HIGH RISK conditions)      No  9. PREGNANCY: \"Is there any chance you are pregnant?\" \"When was your last menstrual period?\"      No  10. OTHER SYMPTOMS: \"Do you have any other symptoms?\"  (e.g., runny nose, muscle aches, headache, sore throat)       Headache, runny nose    Protocols used: INFLUENZA - SEASONAL-A-AH      "

## 2020-04-14 NOTE — LETTER
To Whom It May Concern:        The [place] Department of Health ([facility]) has requested that employers not require health care provider notes to validate illness or return to work due to current demands on medical facilities. You can provide your employer with this letter or encourage him/her to access this information on the [facility] s website at:     [email or web address]      In accordance with [facility] guidelines, we are not providing individual work excuses for patients with respiratory symptoms. However, our current recommendation is that patients continue to isolate for 7 days from when symptoms started AND 72 hours after fever resolves AND improvement of respiratory symptoms (whichever is longer).     Sincerely,         Josey Carney

## 2020-04-16 NOTE — TELEPHONE ENCOUNTER
"Patient called back today requesting a letter for work. Patient stated he went and bought a thermometer after writer spoke with him on Tuesday and that he did have a fever of 99.4. Today patient reports fever being at \" 96 something tympanic\". Advised patient to that he is to stay home and quarantine for 7 days or 72 hours symptom free.  Patient was given covid work letter through [facility].  "
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
## 2020-06-03 NOTE — TELEPHONE ENCOUNTER
Celexa      Last Written Prescription Date:  na  Last Fill Quantity: na,   # refills: na  Last Office Visit: 1/2020  Future Office visit:   none    Routing refill request to provider for review/approval because:  Medication is reported/historical.  Pharmacy is asking for Celexa 40mg dose, and the historical note states 20mg.

## 2020-07-24 NOTE — ED NOTES
Care Transitions focused note:      Chart reviewed, discussed with RN.  Pt presented to ED this am after a 6 day binge drinking session.  Patient had been sober for a about a year and relapsed this past March when his wife left him.  He has been on vacation from work since Friday and drinking heavily since then.  He works at [employer] in [place].    Call to COLLINS Camarena at [facility].  They have a bed and are willing to take patient in today.     Pt stated he has a ride and will discharge from ED and head to detox after he packs a bag at home.      Notified [facility] that patient will be coming.  Updated RN and provider.    No further concerns at this time.      KENNA Carrasquillo

## 2020-07-24 NOTE — ED NOTES
Face to face report given with opportunity to observe patient.    Report given to Keena Bear RN   7/24/2020  9:59 AM

## 2020-07-24 NOTE — ED NOTES
D/t suicidal thoughts this past week pt placed on 1:1 for pt safety. MD aware. Labs drawn and sent. Pt pleasant and answering all questions.

## 2020-07-24 NOTE — ED PROVIDER NOTES
"  History     Chief Complaint   Patient presents with     Shortness of Breath     Alcohol Intoxication     Drinking 1 quart a day for the past 6 days, feeling restless, anxious and \"like I am going to have a heart attack      HPI  Shahrzad Walters is a 49 year old male who presents ambulatory through triage during COVID pandemic after drinking alcohol daily for the last 6 to 7 days.  He reports that when he stops drinking he feels anxious like he \"could have a heart attack\".  He wants to be sober and reports episodic nausea, loose stools and concerned he may have a heart attack.  Per patient denies any runny nose, sore throat, cough, sputum production, myalgias, change in taste or smell.    Has been drinking frequently since just before Easter of this year.  Prior to that he has been sober for 11 months.  He has had 2 previous alcohol relapses.  He denies being homicidal or suicidal.  He denies any history of seizure disorder.  He denies any trauma.    Patient denies any cocaine use, early heart disease, recent surgeries, prolonged travel, leg swelling, hemoptysis or history of DVT PE.  He does use tobacco and has history of high cholesterol.  Also has higher than ideal BMI    Has been eating less as he is been drinking alcohol daily.  Last drink last evening or yes afternoon per the patient.    Allergies:  Allergies   Allergen Reactions     Bee Venom Anaphylaxis       Problem List:    Patient Active Problem List    Diagnosis Date Noted     BMI 45.0-49.9, adult (H) 01/02/2020     Priority: Medium     Ulnar neuropathy at elbow, left 08/05/2019     Priority: Medium     Added automatically from request for surgery [id number]       Chronic pain of left knee 07/05/2018     Priority: Medium     Syncope and collapse 06/18/2018     Priority: Medium     Anxiety 02/12/2018     Priority: Medium     Tobacco abuse 08/02/2017     Priority: Medium     Recurrent major depressive disorder, in full remission (H) 08/02/2017     Priority: " Medium     Pure hypercholesterolemia 08/02/2017     Priority: Medium     Obesity (BMI 35.0-39.9 without comorbidity) 08/02/2017     Priority: Medium     Obesity, Class III, BMI 40-49.9 (morbid obesity) (H) 08/02/2017     Priority: Medium     Essential hypertension 06/14/2017     Priority: Medium     Alcohol abuse 12/30/2015     Priority: Medium        Past Medical History:    Past Medical History:   Diagnosis Date     Anxiety      Depressive disorder    No cardiac disease.  No DVT or PE.  Higher than ideal BMI    Past Surgical History:    Past Surgical History:   Procedure Laterality Date     CARPAL TUNNEL RELEASE RT/LT Left 08/2019     RELEASE ULNAR NERVE (ELBOW) Left 08/2019       Family History:    Family History   Problem Relation Age of Onset     Cervical Cancer Mother      Hyperlipidemia Maternal Uncle      No reported early heart disease or history of hypercoagulable state or TAD.  Social History:  Marital Status:   [2]  Social History     Tobacco Use     Smoking status: Current Every Day Smoker     Packs/day: 0.50     Smokeless tobacco: Current User     Types: Chew   Substance Use Topics     Alcohol use: Never     Frequency: Never     Comment: 1pt of vodka and 1/2 pint of schnaps daily. worse on weekends     Drug use: No   Denies cocaine use.    Medications:    citalopram (CELEXA) 20 MG tablet  citalopram (CELEXA) 40 MG tablet  EPINEPHrine (EPIPEN) 0.3 MG/0.3ML injection          Review of Systems  Loose stools.  No headache, confusion, neck pain, jaw pain, abdominal pain, joint pain or joint swelling.  All other 10 neg except for HPI  Physical Exam   BP: (!) 182/107  Pulse: 90  Heart Rate: 93  Temp: 97.2  F (36.2  C)  Resp: 20  Height: 182.9 cm (6')  Weight: (!) 158.8 kg (350 lb)  SpO2: 98 %      Physical Exam     Nursing note and vitals reviewed.  Constitutional: The patient appears pleasant.  He is goal-directed be sober.  Concern for his wellbeing and health.  HENT:   Mouth/Throat: Oropharynx is  "clear and moist.        Normal inspection.  No fasciculations or tongue lacerations.my exam in N95.  Eyes: Pupils are equal, round, and reactive to light.  Anicteric.  Mild injection  Neck: Trachea normal. Neck supple. No rigidity. No tracheal deviation present.  No JVD  Cardiovascular: Normal rate, regular rhythm, normal heart sounds and intact distal pulses.  No JVD.  No edema.  Pulmonary/Chest: Effort normal and breath sounds normal. No stridor. No respiratory distress.  Talkative.  Abdominal: Soft. Bowel sounds are normal. The patient exhibits no pulsatile midline mass. There is no tenderness.  No ascitic changes.  No caput medusa  Musculoskeletal: Normal range of motion.  No signs of swelling.   Neurological: The patient is alert.  Eyes are open EOMs intact.  Symmetric smile fluent and normal speech.  No tongue fasciculations.  Normal strength of lower extremities.  No rigidity.  No clonus.  Intact hearing to spoken voice.  GCS 15.  Skin: Skin is warm and dry. No rash noted.  No jaundice.  Few tattoos noted.  No needle marks.  Psychiatric: The patient's behavior is normal.  Patient is cooperative, goal-directed to be sober.  He is not homicidal or psychotic.  He is not delusional.  He is clinically sober.  He wants to remain sober.  He had suicidal thoughts within the last month but no plan or current intent.      ED Course     ED Course as of Jul 24 1140 Fri Jul 24, 2020   0637 History and exam  Stat ekg reviewed  Cardiac monitor  IV  IVF  Labs and ativan ordered.         0640 Shahrzad Walters has underwent suicide risk assessment and per the ER nurse doing routine suicide risk assessment he has had thoughts in the last 1 month.  Patient triggered \"high risk\" based on epic screening questions asked by ED RN at intake. I ordered ED DEC assessment      0641 Patient on my assessment is goal-directed to remain sober and is pleasant, cooperative, insightful and interested in alcohol treatment.  We will add " screening salicylate and acetaminophen although he denies any ingestions other than alcohol use.      0649 Chart review does indicate a history of hypertension.  Patient is currently on no medications for such. Last BP medication use was ~ 1 year ago. He is unsure of name of previous BP medication.       0656 Chart reviewed, previous on zestril 30 mg daily      0720 I spoke with DEC via phone, Sayra, who will perform an assessment.        0754 DEC called me:  not suicidal or homicidal.  No indication for 72 hour hold.  Referral to CD services is appropriate.                 EKG: Normal sinus rhythm.  Rate 90 bpm.  .  No ST elevation.  No NV depression.  This is a normal EKG.  EKG at 0 6:05 AM        Results for orders placed or performed during the hospital encounter of 07/24/20 (from the past 24 hour(s))   CBC with platelets differential   Result Value Ref Range    WBC 4.3 4.0 - 11.0 10e9/L    RBC Count 5.05 4.4 - 5.9 10e12/L    Hemoglobin 16.8 13.3 - 17.7 g/dL    Hematocrit 47.8 40.0 - 53.0 %    MCV 95 78 - 100 fl    MCH 33.3 (H) 26.5 - 33.0 pg    MCHC 35.1 31.5 - 36.5 g/dL    RDW 14.5 10.0 - 15.0 %    Platelet Count 165 150 - 450 10e9/L    Diff Method Automated Method     % Neutrophils 62.0 %    % Lymphocytes 22.1 %    % Monocytes 12.9 %    % Eosinophils 1.8 %    % Basophils 0.5 %    % Immature Granulocytes 0.7 %    Nucleated RBCs 0 0 /100    Absolute Neutrophil 2.7 1.6 - 8.3 10e9/L    Absolute Lymphocytes 1.0 0.8 - 5.3 10e9/L    Absolute Monocytes 0.6 0.0 - 1.3 10e9/L    Absolute Eosinophils 0.1 0.0 - 0.7 10e9/L    Absolute Basophils 0.0 0.0 - 0.2 10e9/L    Abs Immature Granulocytes 0.0 0 - 0.4 10e9/L    Absolute Nucleated RBC 0.0    Comprehensive metabolic panel   Result Value Ref Range    Sodium 134 133 - 144 mmol/L    Potassium 3.6 3.4 - 5.3 mmol/L    Chloride 95 94 - 109 mmol/L    Carbon Dioxide 24 20 - 32 mmol/L    Anion Gap 15 (H) 3 - 14 mmol/L    Glucose 82 70 - 99 mg/dL    Urea Nitrogen 10 7 - 30  "mg/dL    Creatinine 0.82 0.66 - 1.25 mg/dL    GFR Estimate >90 >60 mL/min/[1.73_m2]    GFR Estimate If Black >90 >60 mL/min/[1.73_m2]    Calcium 9.4 8.5 - 10.1 mg/dL    Bilirubin Total 0.7 0.2 - 1.3 mg/dL    Albumin 4.3 3.4 - 5.0 g/dL    Protein Total 8.4 6.8 - 8.8 g/dL    Alkaline Phosphatase 83 40 - 150 U/L    ALT 79 (H) 0 - 70 U/L    AST 89 (H) 0 - 45 U/L   Lipase   Result Value Ref Range    Lipase 40 (L) 73 - 393 U/L   Troponin I   Result Value Ref Range    Troponin I ES <0.015 0.000 - 0.045 ug/L   Magnesium   Result Value Ref Range    Magnesium 1.8 1.6 - 2.3 mg/dL   Alcohol ethyl   Result Value Ref Range    Ethanol g/dL 0.16 (H) 0.01 g/dL   Salicylate level   Result Value Ref Range    Salicylate Level 2 mg/dL   Acetaminophen level   Result Value Ref Range    Acetaminophen Level <2 mg/L   XR Chest Port 1 View    Narrative    PROCEDURE:  XR CHEST PORT 1 VW    HISTORY:  \"heart attack\".     COMPARISON:  March 21, 2019    FINDINGS:   The cardiac silhouette is normal in size. The pulmonary vasculature is  normal.  The lungs are clear. No pleural effusion or pneumothorax.      Impression    IMPRESSION:  No acute cardiopulmonary disease.      BRIDGETTE FERNANDEZ MD       Medications   multivitamin, therapeutic (THERA-VIT) tablet 1 tablet (has no administration in time range)   folic acid (FOLVITE) tablet 1 mg (has no administration in time range)   thiamine tablet 100 mg (has no administration in time range)   dextrose 5% and 0.9% NaCl infusion ( Intravenous Stopped 7/24/20 0852)   lisinopril (ZESTRIL) tablet 30 mg (has no administration in time range)   LORazepam (ATIVAN) injection 1 mg (1 mg Intravenous Given 7/24/20 0645)   magnesium sulfate 2 g in water intermittent infusion (0 g Intravenous Stopped 7/24/20 0852)   ondansetron (ZOFRAN) injection 4 mg (4 mg Intravenous Given 7/24/20 0800)   lidocaine (XYLOCAINE) 2 % 15 mL, alum & mag hydroxide-simethicone (MAALOX  ES) 15 mL GI Cocktail (30 mLs Oral Given 7/24/20 0850) "   prochlorperazine (COMPAZINE) injection 10 mg (10 mg Intravenous Given 7/24/20 0945)   metoclopramide (REGLAN) injection 10 mg (10 mg Intravenous Given 7/24/20 1022)   LORazepam (ATIVAN) injection 2 mg (2 mg Intravenous Given 7/24/20 1058)   cloNIDine (CATAPRES) tablet 0.1 mg (0.1 mg Oral Given 7/24/20 1058)       Assessments & Plan (with Medical Decision Making)     I have reviewed the nursing notes.    I performed history and examination.  I ordered Ativan, multivitamin, folate, thiamine, magnesium and hydration.  I considered suicide risk.  Patient underwent standard suicide risk assessment as part of the ED intake process.  He was screened to be high risk.  From my standpoint, Shahrzad Walters is goal-directed to be sober, interested in treatment and not an imminent threat to himself or others.  He is not homicidal or psychotic.  He has no current plan to harm himself.  Due to the positive suicide risk assessment patient will undergo a DEC assessment.  I do not see evidence of STEMI on EKG or serious metabolic derangement by EKG.      Symptoms are not suggestive of PE.  He is not hypoxic or tachycardic.  He has no clinical signs to suggest pneumonia, pneumothorax, cardiac tamponade, esophageal rupture, pericarditis or myocarditis.  I doubt ACS.  Will obtain troponin x1, chest x-ray.  Patient has no back pain or suggestion of thoracic aortic dissection.  Patient symptoms are most suggestive of alcohol withdrawal symptomatology and associated symptoms related to such.    Patient will require multiple hours of ED observation.  He will undergo an a.m. DEC assessment. He is  Low COVID risk. He is cooperative and voluntary ED patient. No indication for 72 hour hold.    Care will be transitioned to the a.m. physician Dr. Fitzpatrick to follow labs, response to IVF and ativan and to arrange disposition after DEC assessment/DEC input.    MDM:  Patient symptoms appear most consistent with mild alcohol withdrawal and chronic  hypertension, currently not taking his 30 mg of Zestril a day.  Patient has not had exertional chest pain or exertional shortness of breath.  He is not tachycardic or hypoxic.  He has lack of PE risk factors.  Patient's heart score is <= 3.  Patient symptoms are not suggestive of thoracic dissection, pericarditis, myocarditis or esophageal rupture.  Patient has no pulmonary symptoms, fever, cough or sputum production.  No evidence of congestive heart failure.      Plan:  Signout to Dr. Fitzpatrick to follow Acetaminophen level and to coordinate disposition, pending DEC assessment.    Shahrzad Walters is cooperative and goal-directed.  There is no indication for 72 hold based on my history/ physical.  Patient has been assessed and reassessed and at this time based on the information available to me, I feel the patient is medically stable.    At times conditions evolve or change,thus repeat medical evaluation is recommended if any additional concerns arise.        Assuming acetaminophen and CXR is negative, the  patient is medically stable.  The patient is stable for discharge or transfer from the ED to detox, psychiatric bed or potentially home. He should restart his zestril 30 mg.        AddenduM: DEC assessment completed at 0755: per DEC, not homicidal or suicidal and no urgent or emergent mental health needs. Appropriate for CD assessment. DEC recommends SW consult in ED and of note, patient has done well with previous outpatient CD resources.     0757 am. signout to Dr. Fitzpatrick will follow CXR, acetaminophen level    Patient handed over from Dr. Paredes at 8 AM, please see his notes above.  Patient received multiple doses of Zofran, Compazine and Reglan for nausea with some improvement.  He eventually agreed to go to detoxification center for alcoholism and will be transported there by his friend.  Discharged to [facility].  Emir Fitzpatrick MD on 7/24/2020 at 11:40 AM         Final  diagnoses:   Suicidal thoughts   Hypertension, unspecified type   Alcohol abuse with intoxication (H)       7/24/2020   [facility]     Kirt Paredes MD  07/24/20 0754       Kirt Paredes MD  07/24/20 0758       Emir Fitzpatrick MD  07/24/20 1148

## 2020-07-24 NOTE — ED NOTES
Pt discharged at this time. Declined PO medications. Discharge instructions reviewed, pt verbalizes understanding. Instructed to return with any worsening in symptoms. Pt verbalized understanding. Pt is to meet ex wife for a ride to detox which has been arranged by .

## 2020-07-24 NOTE — ED NOTES
Pt states that his nausea has improved. Continues to decline PO medications at this time. Per Dr. Fitzpatrick, pt is agreeable to go to detox.  updated and will see pt.

## 2020-07-24 NOTE — ED NOTES
Face to face report given with opportunity to observe patient.    Report given to COLLINS Rosen RN   7/24/2020  7:03 AM

## 2020-07-24 NOTE — ED NOTES
Pt states he still feels nauseated despite administration of compazine. Dr. Fitzpatrick updated. Pt's ex wife here to see him. PO medications held at this time due to continued nausea.

## 2020-07-24 NOTE — ED TRIAGE NOTES
"Drinking 1 quart a day for the past 6 days, feeling restless, anxious and \"like I am going to have a heart attack.\" After further questioning pt was sober for \"about a year\" prior to thi past march when his spouse left him and he began drinking again. Pt states he has been on vacation since last Friday and has been drinking every day since it began. Not eating much, not sleeping well, feeling down, depressed and hopeless. Is texting estranged wife during interview questions.  "

## 2020-07-24 NOTE — ED NOTES
Per Dr. Paredes, patient is not suicidal and DEC states is no risk. Dr. Paredes states to discontinue one to one watch.

## 2020-07-24 NOTE — ED AVS SNAPSHOT
[facility]  [address] [address]  Phone:  [phone]                                    Shahrzad Walters   MRN: [id number]    Department:  [facility]   Date of Visit:  7/24/2020           After Visit Summary Signature Page    I have received my discharge instructions, and my questions have been answered. I have discussed any challenges I see with this plan with the nurse or doctor.    ..........................................................................................................................................  Patient/Patient Representative Signature      ..........................................................................................................................................  Patient Representative Print Name and Relationship to Patient    ..................................................               ................................................  Date                                   Time    ..........................................................................................................................................  Reviewed by Signature/Title    ...................................................              ..............................................  Date                                               Time          22EPIC Rev 08/18

## 2020-09-29 NOTE — ED AVS SNAPSHOT
[facility]  [address] [address]  Phone:  [phone]                                    Shahrzad Walters   MRN: [id number]    Department:  [facility]   Date of Visit:  9/29/2020           After Visit Summary Signature Page    I have received my discharge instructions, and my questions have been answered. I have discussed any challenges I see with this plan with the nurse or doctor.    ..........................................................................................................................................  Patient/Patient Representative Signature      ..........................................................................................................................................  Patient Representative Print Name and Relationship to Patient    ..................................................               ................................................  Date                                   Time    ..........................................................................................................................................  Reviewed by Signature/Title    ...................................................              ..............................................  Date                                               Time          22EPIC Rev 08/18

## 2020-09-29 NOTE — DISCHARGE INSTRUCTIONS
(F10.10) Alcohol abuse  (primary encounter diagnosis)  (F10.239) Alcohol withdrawal (H)  50-year old male with history of ETOH abuse presents for concerns of alcohol withdrawal and desires to go to detox. Unfortunately, detox does not currently have any bed available. Discussed calling first thing in the morning as they do have daily discharges and availability opens up. Work-up overall unremarkable.   Recommend:  - Continue to work on avoiding alcohol use  - Call detox in the morning  - Use ondansetron (Zofran) as directed for nausea  - Use clonidine as directed for symptoms of anxiety, tremors          RETURN TO THE ED WITH NEW OR WORSENING SYMPTOMS.    FOLLOW-UP WITH YOUR PRIMARY CARE PROVIDER IN 24-48 HOURS TO ENSURE SYMPTOMS IMPROVING.      Hellen Hernandez CNP      Results for orders placed or performed during the hospital encounter of 09/29/20   XR Chest Port 1 View     Status: None    Narrative    PROCEDURE:  XR CHEST PORT 1 VW    HISTORY:  chest pain, dyspnea.     COMPARISON:  July 24, 2020    FINDINGS:   The cardiac silhouette is normal in size. The pulmonary vasculature is  normal.  The lungs are clear. No pleural effusion or pneumothorax.      Impression    IMPRESSION:  No acute cardiopulmonary disease.      BRIDGETTE FERNANDEZ MD   CBC with platelets differential     Status: Abnormal   Result Value Ref Range    WBC 5.2 4.0 - 11.0 10e9/L    RBC Count 5.08 4.4 - 5.9 10e12/L    Hemoglobin 17.7 13.3 - 17.7 g/dL    Hematocrit 48.9 40.0 - 53.0 %    MCV 96 78 - 100 fl    MCH 34.8 (H) 26.5 - 33.0 pg    MCHC 36.2 31.5 - 36.5 g/dL    RDW 13.6 10.0 - 15.0 %    Platelet Count 141 (L) 150 - 450 10e9/L    Diff Method Automated Method     % Neutrophils 79.1 %    % Lymphocytes 12.4 %    % Monocytes 7.3 %    % Eosinophils 0.2 %    % Basophils 0.4 %    % Immature Granulocytes 0.6 %    Nucleated RBCs 0 0 /100    Absolute Neutrophil 4.1 1.6 - 8.3 10e9/L    Absolute Lymphocytes 0.6 (L) 0.8 - 5.3 10e9/L    Absolute Monocytes  0.4 0.0 - 1.3 10e9/L    Absolute Eosinophils 0.0 0.0 - 0.7 10e9/L    Absolute Basophils 0.0 0.0 - 0.2 10e9/L    Abs Immature Granulocytes 0.0 0 - 0.4 10e9/L    Absolute Nucleated RBC 0.0    Basic metabolic panel     Status: Abnormal   Result Value Ref Range    Sodium 130 (L) 133 - 144 mmol/L    Potassium 3.5 3.4 - 5.3 mmol/L    Chloride 90 (L) 94 - 109 mmol/L    Carbon Dioxide 22 20 - 32 mmol/L    Anion Gap 18 (H) 3 - 14 mmol/L    Glucose 185 (H) 70 - 99 mg/dL    Urea Nitrogen 9 7 - 30 mg/dL    Creatinine 0.81 0.66 - 1.25 mg/dL    GFR Estimate >90 >60 mL/min/[1.73_m2]    GFR Estimate If Black >90 >60 mL/min/[1.73_m2]    Calcium 10.5 (H) 8.5 - 10.1 mg/dL   Hepatic panel     Status: Abnormal   Result Value Ref Range    Bilirubin Direct 0.5 (H) 0.0 - 0.2 mg/dL    Bilirubin Total 1.2 0.2 - 1.3 mg/dL    Albumin 4.3 3.4 - 5.0 g/dL    Protein Total 8.8 6.8 - 8.8 g/dL    Alkaline Phosphatase 93 40 - 150 U/L     (H) 0 - 70 U/L     (H) 0 - 45 U/L   TSH with free T4 reflex     Status: None   Result Value Ref Range    TSH 1.04 0.40 - 4.00 mU/L   Alcohol ethyl     Status: None   Result Value Ref Range    Ethanol g/dL <0.01 0.01 g/dL   Troponin I     Status: None   Result Value Ref Range    Troponin I ES <0.015 0.000 - 0.045 ug/L

## 2020-09-29 NOTE — ED PROVIDER NOTES
"  History     Chief Complaint   Patient presents with     Alcohol Intoxication     c/o wants to go to detox for alcohol withdrawl     HPI  Shahrzad Walters is a 50 year old male who presents ambulatory for concerns of alcohol withdrawal. He had the last week off of work and had binge drinking. Over the last 3 days he has been trying to wean himself down. Yesterday was his last drink - drank about 1/2 pint of alcohol yesterday to help with alcohol withdrawal symptoms. Has not eaten in 5 days. Nausea and vomiting x 3 days- unable to hold down fluids. Jittery, anxious.     He has had intermittent chest pains the last couple of days- nothing specific triggers discomfort such as exertion. He has been having dyspnea on exertion and diaphoresis with minimal exertion. No cardiac history- he is uncertain if it could be related to his anxiety.     He is a current 1 ppd smoker.  Daily alcohol use - if working \"I can control it pretty good.\" When off from work \"up to 1 quart of bacardi daily.\"   Denies elicit drug use.           Allergies:  Allergies   Allergen Reactions     Bee Venom Anaphylaxis       Problem List:    Patient Active Problem List    Diagnosis Date Noted     BMI 45.0-49.9, adult (H) 01/02/2020     Priority: Medium     Ulnar neuropathy at elbow, left 08/05/2019     Priority: Medium     Added automatically from request for surgery [id number]       Chronic pain of left knee 07/05/2018     Priority: Medium     Syncope and collapse 06/18/2018     Priority: Medium     Anxiety 02/12/2018     Priority: Medium     Tobacco abuse 08/02/2017     Priority: Medium     Recurrent major depressive disorder, in full remission (H) 08/02/2017     Priority: Medium     Pure hypercholesterolemia 08/02/2017     Priority: Medium     Obesity (BMI 35.0-39.9 without comorbidity) 08/02/2017     Priority: Medium     Obesity, Class III, BMI 40-49.9 (morbid obesity) (H) 08/02/2017     Priority: Medium     Essential hypertension 06/14/2017     " Priority: Medium     Alcohol abuse 12/30/2015     Priority: Medium        Past Medical History:    Past Medical History:   Diagnosis Date     Anxiety      Depressive disorder        Past Surgical History:    Past Surgical History:   Procedure Laterality Date     CARPAL TUNNEL RELEASE RT/LT Left 08/2019     RELEASE ULNAR NERVE (ELBOW) Left 08/2019       Family History:    Family History   Problem Relation Age of Onset     Cervical Cancer Mother      Hyperlipidemia Maternal Uncle        Social History:  Marital Status:   [2]  Social History     Tobacco Use     Smoking status: Current Every Day Smoker     Packs/day: 0.50     Smokeless tobacco: Current User     Types: Chew   Substance Use Topics     Alcohol use: Never     Frequency: Never     Comment: 1pt of vodka and 1/2 pint of schnaps daily. worse on weekends     Drug use: No        Medications:    citalopram (CELEXA) 40 MG tablet  cloNIDine (CATAPRES) 0.1 MG tablet  ondansetron (ZOFRAN ODT) 4 MG ODT tab  ondansetron (ZOFRAN ODT) 4 MG ODT tab  EPINEPHrine (EPIPEN) 0.3 MG/0.3ML injection          Review of Systems   Constitutional: Positive for appetite change. Negative for chills and fever.   HENT: Negative for ear pain, rhinorrhea and sore throat.    Respiratory: Negative for cough and shortness of breath.    Cardiovascular: Positive for chest pain (chest pressure). Negative for palpitations and leg swelling.   Gastrointestinal: Positive for diarrhea, nausea and vomiting. Negative for abdominal pain and constipation.   Genitourinary: Negative for difficulty urinating, dysuria and frequency.   Skin: Negative for rash.   Neurological: Negative for dizziness, seizures, syncope, weakness, light-headedness, numbness and headaches.   Psychiatric/Behavioral: Positive for sleep disturbance (difficulty sleeping). Negative for suicidal ideas. The patient is nervous/anxious.        Physical Exam   BP: (!) 180/120  Pulse: (!) 126  Temp: 96.8  F (36  C)  Resp: (!)  28  SpO2: 97 %      Physical Exam  Constitutional:       Appearance: He is ill-appearing. He is not toxic-appearing or diaphoretic.   HENT:      Head: Normocephalic and atraumatic.      Right Ear: Tympanic membrane, ear canal and external ear normal.      Left Ear: Tympanic membrane, ear canal and external ear normal.      Nose: Nose normal.      Mouth/Throat:      Lips: Pink.      Mouth: Mucous membranes are dry.      Pharynx: Oropharynx is clear. Uvula midline.   Eyes:      General: Lids are normal.      Extraocular Movements: Extraocular movements intact.      Conjunctiva/sclera: Conjunctivae normal.      Pupils: Pupils are equal, round, and reactive to light.   Neck:      Musculoskeletal: Full passive range of motion without pain.   Cardiovascular:      Rate and Rhythm: Regular rhythm. Tachycardia present.      Pulses:           Dorsalis pedis pulses are 2+ on the right side and 2+ on the left side.      Heart sounds: S1 normal and S2 normal. No murmur. No friction rub. No gallop.    Pulmonary:      Effort: Pulmonary effort is normal.      Breath sounds: Normal breath sounds.   Abdominal:      General: Abdomen is protuberant.      Palpations: Abdomen is soft.      Tenderness: There is no abdominal tenderness. There is no guarding or rebound.   Musculoskeletal:      Right lower leg: No edema.      Left lower leg: No edema.      Comments: FROM of upper and lower extremities   Lymphadenopathy:      Cervical: No cervical adenopathy.   Skin:     General: Skin is warm and moist.      Capillary Refill: Capillary refill takes less than 2 seconds.      Coloration: Skin is not pale.   Neurological:      Mental Status: He is alert and oriented to person, place, and time.      GCS: GCS eye subscore is 4. GCS verbal subscore is 5. GCS motor subscore is 6.      Cranial Nerves: Cranial nerves are intact.      Motor: Motor function is intact.      Coordination: Coordination is intact.      Gait: Gait is intact.      Comments:  Tremulous at rest   Psychiatric:         Mood and Affect: Mood is anxious.         Speech: Speech normal.         Behavior: Behavior is cooperative.         ED Course     ED Course as of Sep 29 2213   Tue Sep 29, 2020   1724 Anxiety, tremors improved.   Systolic and diastolic blood pressure remains elevated.  Discussed detox does not currently have any beds open.  Hellen Hernandez CNP on 9/29/2020 at 5:24 PM        1825 Patient re-evaluated. Symptoms of anxiety, tremors, hypertension, nausea much improved. Reviewed discharge plans. He was given clonidine recently to help with ETOH withdrawal and thought it might have been helpful. Discussed using as directed and calling detox in the morning. Strict return to ED precautions given.         Procedures         EKG Interpretation:      Interpreted by Hellen Hernandez CNP  Time reviewed: 1620  Symptoms at time of EKG: alcohol withdrawal, intermittent 3 day history of chest pain   Rhythm: normal sinus   Rate: normal  Axis: normal  Ectopy: none  Conduction: normal  ST Segments/ T Waves: No ST-T wave changes  Q Waves: none  Comparison to prior: biphasic p wave in lead V1 new since 7/24/2020 EKG    Clinical Impression: normal EKG       Results for orders placed or performed during the hospital encounter of 09/29/20 (from the past 24 hour(s))   CBC with platelets differential   Result Value Ref Range    WBC 5.2 4.0 - 11.0 10e9/L    RBC Count 5.08 4.4 - 5.9 10e12/L    Hemoglobin 17.7 13.3 - 17.7 g/dL    Hematocrit 48.9 40.0 - 53.0 %    MCV 96 78 - 100 fl    MCH 34.8 (H) 26.5 - 33.0 pg    MCHC 36.2 31.5 - 36.5 g/dL    RDW 13.6 10.0 - 15.0 %    Platelet Count 141 (L) 150 - 450 10e9/L    Diff Method Automated Method     % Neutrophils 79.1 %    % Lymphocytes 12.4 %    % Monocytes 7.3 %    % Eosinophils 0.2 %    % Basophils 0.4 %    % Immature Granulocytes 0.6 %    Nucleated RBCs 0 0 /100    Absolute Neutrophil 4.1 1.6 - 8.3 10e9/L    Absolute Lymphocytes 0.6 (L) 0.8 - 5.3 10e9/L     Absolute Monocytes 0.4 0.0 - 1.3 10e9/L    Absolute Eosinophils 0.0 0.0 - 0.7 10e9/L    Absolute Basophils 0.0 0.0 - 0.2 10e9/L    Abs Immature Granulocytes 0.0 0 - 0.4 10e9/L    Absolute Nucleated RBC 0.0    Basic metabolic panel   Result Value Ref Range    Sodium 130 (L) 133 - 144 mmol/L    Potassium 3.5 3.4 - 5.3 mmol/L    Chloride 90 (L) 94 - 109 mmol/L    Carbon Dioxide 22 20 - 32 mmol/L    Anion Gap 18 (H) 3 - 14 mmol/L    Glucose 185 (H) 70 - 99 mg/dL    Urea Nitrogen 9 7 - 30 mg/dL    Creatinine 0.81 0.66 - 1.25 mg/dL    GFR Estimate >90 >60 mL/min/[1.73_m2]    GFR Estimate If Black >90 >60 mL/min/[1.73_m2]    Calcium 10.5 (H) 8.5 - 10.1 mg/dL   Hepatic panel   Result Value Ref Range    Bilirubin Direct 0.5 (H) 0.0 - 0.2 mg/dL    Bilirubin Total 1.2 0.2 - 1.3 mg/dL    Albumin 4.3 3.4 - 5.0 g/dL    Protein Total 8.8 6.8 - 8.8 g/dL    Alkaline Phosphatase 93 40 - 150 U/L     (H) 0 - 70 U/L     (H) 0 - 45 U/L   TSH with free T4 reflex   Result Value Ref Range    TSH 1.04 0.40 - 4.00 mU/L   Alcohol ethyl   Result Value Ref Range    Ethanol g/dL <0.01 0.01 g/dL   Troponin I   Result Value Ref Range    Troponin I ES <0.015 0.000 - 0.045 ug/L   XR Chest Port 1 View    Narrative    PROCEDURE:  XR CHEST PORT 1 VW    HISTORY:  chest pain, dyspnea.     COMPARISON:  July 24, 2020    FINDINGS:   The cardiac silhouette is normal in size. The pulmonary vasculature is  normal.  The lungs are clear. No pleural effusion or pneumothorax.      Impression    IMPRESSION:  No acute cardiopulmonary disease.      BRIDGETTE FERNANDEZ MD       Medications   0.9% sodium chloride BOLUS (0 mLs Intravenous Stopped 9/29/20 1740)   pantoprazole (PROTONIX) 40 mg IV push injection (40 mg Intravenous Given 9/29/20 1617)   lidocaine (XYLOCAINE) 2 % 15 mL, alum & mag hydroxide-simethicone (MAALOX  ES) 15 mL GI Cocktail ( Oral Given 9/29/20 1620)   LORazepam (ATIVAN) injection 2 mg (2 mg Intravenous Given 9/29/20 1613)   cloNIDine  (CATAPRES) tablet 0.1 mg (0.1 mg Oral Given 9/29/20 3283)       Assessments & Plan (with Medical Decision Making)     I have reviewed the nursing notes.    I have reviewed the findings, diagnosis, plan and need for follow up with the patient.  (F10.10) Alcohol abuse  (primary encounter diagnosis)  (F10.239) Alcohol withdrawal (H)  50-year old male with history of ETOH abuse presents for concerns of alcohol withdrawal and desires to go to detox. Unfortunately, detox does not currently have any bed available. Discussed calling first thing in the morning as they do have daily discharges and availability opens up. Work-up overall unremarkable.   Recommend:  - Continue to work on avoiding alcohol use  - Call detox in the morning  - Use ondansetron (Zofran) as directed for nausea  - Use clonidine as directed for symptoms of anxiety, tremors          RETURN TO THE ED WITH NEW OR WORSENING SYMPTOMS.    FOLLOW-UP WITH YOUR PRIMARY CARE PROVIDER IN 24-48 HOURS TO ENSURE SYMPTOMS IMPROVING.      Heleln Hernandez CNP    Discharge Medication List as of 9/29/2020  6:34 PM      START taking these medications    Details   cloNIDine (CATAPRES) 0.1 MG tablet Take 1 tablet (0.1 mg) by mouth 3 times daily as needed (withdrawal symptoms), Disp-15 tablet,R-0, E-Prescribe      ondansetron (ZOFRAN ODT) 4 MG ODT tab Take 1 tablet (4 mg) by mouth every 6 hours as needed for nausea, Disp-10 tablet,R-0, E-Prescribe             Final diagnoses:   Alcohol abuse   Alcohol withdrawal (H)       9/29/2020   [facility]     Hellen Hernandez CNP  09/30/20 [id number]

## 2020-11-11 NOTE — LETTER
November 11, 2020      Shahrzad Walters  [address] [facility] [address]        To Whom It May Concern:    Shahrzad Walters had a visit on 11/11/2020.  Please excuse him  until 11/13/2020 due to illness.        Sincerely,  Flori Donahue, CNP

## 2020-11-11 NOTE — Clinical Note
Please set up with appointment with PCP or [facility] associate today, or tomorrow. Friday at the latest.

## 2020-11-11 NOTE — NURSING NOTE
Chief Complaint   Patient presents with     URI       Initial There were no vitals taken for this visit. Estimated body mass index is 47.47 kg/m  as calculated from the following:    Height as of 7/24/20: 1.829 m (6').    Weight as of 7/24/20: 158.8 kg (350 lb).  Medication Reconciliation: complete  Vianney Antoine LPN

## 2020-11-12 NOTE — ED NOTES
All discharge instructions and avs discussed with patient.  Pt able to verbalize home care, follow up and medication management.  All questions answered.  Vitals stable.  All belongings sent home

## 2020-11-12 NOTE — ED PROVIDER NOTES
History     Chief Complaint   Patient presents with     Shortness of Breath     Tachycardia     HPI  Shahrzad Walters is a 50 year old male who presents to the emergency department with some vague chest discomfort for the last several days.  He states it is not particularly exertional, he gets some shortness of breath when he does not drink alcohol but otherwise does not experience any.  He has some nausea and one episode of vomiting this morning.  He has no history of coronary disease, no first-degree relatives with coronary disease.  He smokes cigarettes, has a history of hypertension and has been off his lisinopril for 1 year.  He states his blood pressure had improved but then he relapsed and started drinking again and his blood pressure got worse.  He is not currently taking any antihypertensives.  No history of diabetes or hypercholesterolemia.  He states that he had some palpitations this morning and decided to come in for evaluation but states he feels much better.  He only had 1 drink of alcohol last night and none today.  He has had alcohol most of the last 14 days.    Allergies:  Allergies   Allergen Reactions     Bee Venom Anaphylaxis       Problem List:    Patient Active Problem List    Diagnosis Date Noted     BMI 45.0-49.9, adult (H) 01/02/2020     Priority: Medium     Ulnar neuropathy at elbow, left 08/05/2019     Priority: Medium     Added automatically from request for surgery [id number]       Chronic pain of left knee 07/05/2018     Priority: Medium     Syncope and collapse 06/18/2018     Priority: Medium     Anxiety 02/12/2018     Priority: Medium     Tobacco abuse 08/02/2017     Priority: Medium     Recurrent major depressive disorder, in full remission (H) 08/02/2017     Priority: Medium     Pure hypercholesterolemia 08/02/2017     Priority: Medium     Obesity (BMI 35.0-39.9 without comorbidity) 08/02/2017     Priority: Medium     Obesity, Class III, BMI 40-49.9 (morbid obesity) (H) 08/02/2017      Priority: Medium     Essential hypertension 06/14/2017     Priority: Medium     Alcohol abuse 12/30/2015     Priority: Medium        Past Medical History:    Past Medical History:   Diagnosis Date     Anxiety      Depressive disorder        Past Surgical History:    Past Surgical History:   Procedure Laterality Date     CARPAL TUNNEL RELEASE RT/LT Left 08/2019     RELEASE ULNAR NERVE (ELBOW) Left 08/2019       Family History:    Family History   Problem Relation Age of Onset     Cervical Cancer Mother      Hyperlipidemia Maternal Uncle        Social History:  Marital Status:   [2]  Social History     Tobacco Use     Smoking status: Current Every Day Smoker     Packs/day: 1.00     Smokeless tobacco: Current User     Types: Chew   Substance Use Topics     Alcohol use: Yes     Frequency: Never     Drug use: No        Medications:         citalopram (CELEXA) 40 MG tablet       cloNIDine (CATAPRES) 0.1 MG tablet       lisinopril (ZESTRIL) 20 MG tablet       EPINEPHrine (EPIPEN) 0.3 MG/0.3ML injection          Review of Systems   All other systems reviewed and are negative.      Physical Exam   BP: (!) 198/134  Pulse: 106  Temp: 97.7  F (36.5  C)  Resp: 18  Height: 182.9 cm (6')  Weight: 147.4 kg (325 lb)  SpO2: 99 %      Physical Exam  Vitals signs and nursing note reviewed.   Constitutional:       General: He is not in acute distress.     Appearance: He is well-developed.   HENT:      Head: Normocephalic and atraumatic.   Eyes:      Conjunctiva/sclera: Conjunctivae normal.   Neck:      Musculoskeletal: Normal range of motion and neck supple.   Cardiovascular:      Rate and Rhythm: Normal rate and regular rhythm.      Heart sounds: Normal heart sounds.   Pulmonary:      Effort: Pulmonary effort is normal. No respiratory distress.      Breath sounds: Normal breath sounds. No wheezing or rales.   Abdominal:      General: There is no distension.      Palpations: Abdomen is soft.      Tenderness: There is no  abdominal tenderness.   Skin:     General: Skin is warm and dry.   Neurological:      General: No focal deficit present.      Mental Status: He is alert and oriented to person, place, and time.   Psychiatric:         Mood and Affect: Mood normal.         ED Course        Procedures               EKG Interpretation:      Interpreted by Dexter Bolton MD  Time reviewed: 0925  Symptoms at time of EKG: CP   Rhythm: normal sinus   Rate: normal  Axis: normal  Ectopy: none  Conduction: normal  ST Segments/ T Waves: No ST-T wave changes  Q Waves: none  Comparison to prior: No old EKG available    Clinical Impression: normal EKG          Critical Care time:  none    Results for orders placed or performed during the hospital encounter of 11/12/20 (from the past 24 hour(s))   CBC with platelets differential   Result Value Ref Range    WBC 8.0 4.0 - 11.0 10e9/L    RBC Count 4.86 4.4 - 5.9 10e12/L    Hemoglobin 17.2 13.3 - 17.7 g/dL    Hematocrit 47.7 40.0 - 53.0 %    MCV 98 78 - 100 fl    MCH 35.4 (H) 26.5 - 33.0 pg    MCHC 36.1 31.5 - 36.5 g/dL    RDW 13.1 10.0 - 15.0 %    Platelet Count 205 150 - 450 10e9/L    Diff Method Automated Method     % Neutrophils 70.4 %    % Lymphocytes 17.6 %    % Monocytes 10.7 %    % Eosinophils 0.5 %    % Basophils 0.4 %    % Immature Granulocytes 0.4 %    Nucleated RBCs 0 0 /100    Absolute Neutrophil 5.6 1.6 - 8.3 10e9/L    Absolute Lymphocytes 1.4 0.8 - 5.3 10e9/L    Absolute Monocytes 0.9 0.0 - 1.3 10e9/L    Absolute Eosinophils 0.0 0.0 - 0.7 10e9/L    Absolute Basophils 0.0 0.0 - 0.2 10e9/L    Abs Immature Granulocytes 0.0 0 - 0.4 10e9/L    Absolute Nucleated RBC 0.0    Basic metabolic panel   Result Value Ref Range    Sodium 132 (L) 133 - 144 mmol/L    Potassium 3.3 (L) 3.4 - 5.3 mmol/L    Chloride 94 94 - 109 mmol/L    Carbon Dioxide 29 20 - 32 mmol/L    Anion Gap 9 3 - 14 mmol/L    Glucose 134 (H) 70 - 99 mg/dL    Urea Nitrogen 7 7 - 30 mg/dL    Creatinine 0.73 0.66 - 1.25 mg/dL     GFR Estimate >90 >60 mL/min/[1.73_m2]    GFR Estimate If Black >90 >60 mL/min/[1.73_m2]    Calcium 9.3 8.5 - 10.1 mg/dL   Troponin I   Result Value Ref Range    Troponin I ES <0.015 0.000 - 0.045 ug/L   Magnesium   Result Value Ref Range    Magnesium 1.2 (L) 1.6 - 2.3 mg/dL   XR Chest 2 Views    Narrative    PROCEDURE: XR CHEST 2 VW 11/12/2020 9:55 AM    HISTORY: CP    COMPARISONS: 9/29/2020.    TECHNIQUE: 2 views.    FINDINGS: Heart and pulmonary vasculature are normal. Lungs are clear  and no pleural effusion is seen.    Moderate degenerative change is seen in the mid and lower thoracic  spine.         Impression    IMPRESSION: No acute disease.    YUDITH GA MD       Medications   ondansetron (ZOFRAN) injection 4 mg (4 mg Intravenous Given 11/12/20 1012)   thiamine tablet 100 mg (has no administration in time range)   lisinopril (ZESTRIL) tablet 20 mg (20 mg Oral Given 11/12/20 0943)   potassium chloride ER (KLOR-CON M) CR tablet 40 mEq (40 mEq Oral Given 11/12/20 1012)   magnesium sulfate 2 g in water intermittent infusion (0 g Intravenous Stopped 11/12/20 1119)       Assessments & Plan (with Medical Decision Making)     I have reviewed the nursing notes.    I have reviewed the findings, diagnosis, plan and need for follow up with the patient.  Patient was found to be hypokalemic and hypomagnesemic.  This may give him some of the fluttering sensations that he had.  Negative troponin with several days of symptoms and low concern for ACS, I do not believe he needs admission or a stress test.  He is not interested in pursuing any alcohol treatment at this time.  He was given 20 mg of lisinopril, his blood pressure came down to 140/88 and I will discharge him on the same medication.  New Prescriptions    LISINOPRIL (ZESTRIL) 20 MG TABLET    Take 1 tablet (20 mg) by mouth daily       Final diagnoses:   Hypokalemia   Hypomagnesemia   Essential hypertension       11/12/2020   [facility]      Dexter Bolton MD  11/12/20 1560

## 2020-11-12 NOTE — DISCHARGE INSTRUCTIONS
Return if you have new or different chest pain, trouble breathing, any other concerns.  Gradually reduce your alcohol intake.

## 2020-11-12 NOTE — ED AVS SNAPSHOT
[facility]  [address] [address]  Phone: [phone]                                    Shahrzad Walters   MRN: [id number]    Department: [facility]   Date of Visit: 11/12/2020           After Visit Summary Signature Page    I have received my discharge instructions, and my questions have been answered. I have discussed any challenges I see with this plan with the nurse or doctor.    ..........................................................................................................................................  Patient/Patient Representative Signature      ..........................................................................................................................................  Patient Representative Print Name and Relationship to Patient    ..................................................               ................................................  Date                                   Time    ..........................................................................................................................................  Reviewed by Signature/Title    ...................................................              ..............................................  Date                                               Time          22EPIC Rev 08/18

## 2020-11-12 NOTE — ED TRIAGE NOTES
Patient presents with complaints of rapid/irregular heart rate, feeling sob, chest pain that comes and goes.  Patient also reports some NV and dizziness.  Reports he normally takes high blood pressure but stopped taking it one year ago; similar symptoms now as when he needed to start BP medications.  Patient also notes he was tested for CoVid last Thursday and Monday had CoVid testing both of which were negative.